# Patient Record
Sex: MALE | Race: WHITE | Employment: UNEMPLOYED | URBAN - NONMETROPOLITAN AREA
[De-identification: names, ages, dates, MRNs, and addresses within clinical notes are randomized per-mention and may not be internally consistent; named-entity substitution may affect disease eponyms.]

---

## 2017-06-14 ENCOUNTER — APPOINTMENT (OUTPATIENT)
Dept: CT IMAGING | Age: 22
DRG: 885 | End: 2017-06-14
Payer: COMMERCIAL

## 2017-06-14 ENCOUNTER — HOSPITAL ENCOUNTER (INPATIENT)
Age: 22
LOS: 5 days | Discharge: HOME | DRG: 885 | End: 2017-06-20
Attending: FAMILY MEDICINE | Admitting: PSYCHIATRY & NEUROLOGY
Payer: COMMERCIAL

## 2017-06-14 DIAGNOSIS — R44.0 AUDITORY HALLUCINATION: Primary | ICD-10-CM

## 2017-06-14 LAB
ALBUMIN SERPL-MCNC: 3.5 G/DL (ref 3.5–5.2)
ALP BLD-CCNC: 45 U/L (ref 40–130)
ALT SERPL-CCNC: 12 U/L (ref 5–41)
AMPHETAMINE SCREEN, URINE: POSITIVE
ANION GAP SERPL CALCULATED.3IONS-SCNC: 12 MMOL/L (ref 7–19)
AST SERPL-CCNC: 11 U/L (ref 5–40)
BARBITURATE SCREEN URINE: NEGATIVE
BASOPHILS ABSOLUTE: 0 K/UL (ref 0–0.2)
BASOPHILS RELATIVE PERCENT: 0.6 % (ref 0–1)
BENZODIAZEPINE SCREEN, URINE: NEGATIVE
BILIRUB SERPL-MCNC: 0.3 MG/DL (ref 0.2–1.2)
BILIRUBIN URINE: NEGATIVE
BLOOD, URINE: NEGATIVE
BUN BLDV-MCNC: 10 MG/DL (ref 6–20)
CALCIUM SERPL-MCNC: 7.1 MG/DL (ref 8.6–10)
CANNABINOID SCREEN URINE: POSITIVE
CHLORIDE BLD-SCNC: 106 MMOL/L (ref 98–111)
CLARITY: CLEAR
CO2: 22 MMOL/L (ref 22–29)
COCAINE METABOLITE SCREEN URINE: NEGATIVE
COLOR: YELLOW
CREAT SERPL-MCNC: 0.9 MG/DL (ref 0.5–1.2)
EOSINOPHILS ABSOLUTE: 0.2 K/UL (ref 0–0.6)
EOSINOPHILS RELATIVE PERCENT: 2.6 % (ref 0–5)
ETHANOL: <10 MG/DL (ref 0–0.08)
GFR NON-AFRICAN AMERICAN: >60
GLUCOSE BLD-MCNC: 72 MG/DL (ref 74–109)
GLUCOSE URINE: NEGATIVE MG/DL
HCT VFR BLD CALC: 36.3 % (ref 42–52)
HEMOGLOBIN: 12.8 G/DL (ref 14–18)
KETONES, URINE: NEGATIVE MG/DL
LEUKOCYTE ESTERASE, URINE: NEGATIVE
LYMPHOCYTES ABSOLUTE: 1.7 K/UL (ref 1.1–4.5)
LYMPHOCYTES RELATIVE PERCENT: 27.3 % (ref 20–40)
Lab: ABNORMAL
MCH RBC QN AUTO: 33.8 PG (ref 27–31)
MCHC RBC AUTO-ENTMCNC: 35.3 G/DL (ref 33–37)
MCV RBC AUTO: 95.8 FL (ref 80–94)
MONOCYTES ABSOLUTE: 1 K/UL (ref 0–0.9)
MONOCYTES RELATIVE PERCENT: 16.6 % (ref 0–10)
NEUTROPHILS ABSOLUTE: 3.2 K/UL (ref 1.5–7.5)
NEUTROPHILS RELATIVE PERCENT: 52.6 % (ref 50–65)
NITRITE, URINE: NEGATIVE
OPIATE SCREEN URINE: NEGATIVE
PDW BLD-RTO: 11.5 % (ref 11.5–14.5)
PH UA: 6
PLATELET # BLD: 205 K/UL (ref 130–400)
PMV BLD AUTO: 9.1 FL (ref 9.4–12.4)
POTASSIUM SERPL-SCNC: 3.2 MMOL/L (ref 3.5–5)
PROTEIN UA: NEGATIVE MG/DL
RBC # BLD: 3.79 M/UL (ref 4.7–6.1)
SODIUM BLD-SCNC: 140 MMOL/L (ref 136–145)
SPECIFIC GRAVITY UA: 1.02
TOTAL PROTEIN: 5.1 G/DL (ref 6.6–8.7)
TROPONIN: <0.01 NG/ML (ref 0–0.03)
TSH SERPL DL<=0.05 MIU/L-ACNC: 2.84 UIU/ML (ref 0.27–4.2)
UROBILINOGEN, URINE: 1 E.U./DL
WBC # BLD: 6.2 K/UL (ref 4.8–10.8)

## 2017-06-14 PROCEDURE — 84443 ASSAY THYROID STIM HORMONE: CPT

## 2017-06-14 PROCEDURE — 85025 COMPLETE CBC W/AUTO DIFF WBC: CPT

## 2017-06-14 PROCEDURE — 80053 COMPREHEN METABOLIC PANEL: CPT

## 2017-06-14 PROCEDURE — 99284 EMERGENCY DEPT VISIT MOD MDM: CPT | Performed by: EMERGENCY MEDICINE

## 2017-06-14 PROCEDURE — 70450 CT HEAD/BRAIN W/O DYE: CPT

## 2017-06-14 PROCEDURE — 2580000003 HC RX 258: Performed by: FAMILY MEDICINE

## 2017-06-14 PROCEDURE — 84484 ASSAY OF TROPONIN QUANT: CPT

## 2017-06-14 PROCEDURE — 36415 COLL VENOUS BLD VENIPUNCTURE: CPT

## 2017-06-14 PROCEDURE — 93005 ELECTROCARDIOGRAM TRACING: CPT

## 2017-06-14 PROCEDURE — G0480 DRUG TEST DEF 1-7 CLASSES: HCPCS

## 2017-06-14 PROCEDURE — 99285 EMERGENCY DEPT VISIT HI MDM: CPT

## 2017-06-14 PROCEDURE — 80307 DRUG TEST PRSMV CHEM ANLYZR: CPT

## 2017-06-14 RX ORDER — DEXTROAMPHETAMINE SACCHARATE, AMPHETAMINE ASPARTATE, DEXTROAMPHETAMINE SULFATE AND AMPHETAMINE SULFATE 5; 5; 5; 5 MG/1; MG/1; MG/1; MG/1
20 TABLET ORAL DAILY
Status: ON HOLD | COMMUNITY
End: 2017-06-20

## 2017-06-14 RX ORDER — 0.9 % SODIUM CHLORIDE 0.9 %
1000 INTRAVENOUS SOLUTION INTRAVENOUS ONCE
Status: COMPLETED | OUTPATIENT
Start: 2017-06-14 | End: 2017-06-14

## 2017-06-14 RX ADMIN — SODIUM CHLORIDE 1000 ML: 9 INJECTION, SOLUTION INTRAVENOUS at 17:31

## 2017-06-15 PROCEDURE — 1240000000 HC EMOTIONAL WELLNESS R&B

## 2017-06-16 PROCEDURE — 1240000000 HC EMOTIONAL WELLNESS R&B

## 2017-06-16 ASSESSMENT — SLEEP AND FATIGUE QUESTIONNAIRES
DO YOU HAVE DIFFICULTY SLEEPING: NO
DO YOU USE A SLEEP AID: NO

## 2017-06-16 ASSESSMENT — LIFESTYLE VARIABLES: HISTORY_ALCOHOL_USE: NO

## 2017-06-17 LAB
ANION GAP SERPL CALCULATED.3IONS-SCNC: 13 MMOL/L (ref 7–19)
BUN BLDV-MCNC: 13 MG/DL (ref 6–20)
CALCIUM SERPL-MCNC: 9.6 MG/DL (ref 8.6–10)
CHLORIDE BLD-SCNC: 101 MMOL/L (ref 98–111)
CO2: 27 MMOL/L (ref 22–29)
CREAT SERPL-MCNC: 1.2 MG/DL (ref 0.5–1.2)
GFR NON-AFRICAN AMERICAN: >60
GLUCOSE BLD-MCNC: 86 MG/DL (ref 74–109)
POTASSIUM SERPL-SCNC: 4.4 MMOL/L (ref 3.5–5)
SODIUM BLD-SCNC: 141 MMOL/L (ref 136–145)
VITAMIN B-12: 468 PG/ML (ref 211–946)
VITAMIN D 25-HYDROXY: 41.1 NG/ML

## 2017-06-17 PROCEDURE — 36415 COLL VENOUS BLD VENIPUNCTURE: CPT

## 2017-06-17 PROCEDURE — 90791 PSYCH DIAGNOSTIC EVALUATION: CPT | Performed by: PSYCHIATRY & NEUROLOGY

## 2017-06-17 PROCEDURE — 1240000000 HC EMOTIONAL WELLNESS R&B

## 2017-06-17 PROCEDURE — 82306 VITAMIN D 25 HYDROXY: CPT

## 2017-06-17 PROCEDURE — 80048 BASIC METABOLIC PNL TOTAL CA: CPT

## 2017-06-17 PROCEDURE — 82607 VITAMIN B-12: CPT

## 2017-06-18 PROCEDURE — 99231 SBSQ HOSP IP/OBS SF/LOW 25: CPT | Performed by: NURSE PRACTITIONER

## 2017-06-18 PROCEDURE — 1240000000 HC EMOTIONAL WELLNESS R&B

## 2017-06-19 PROCEDURE — 1240000000 HC EMOTIONAL WELLNESS R&B

## 2017-06-19 PROCEDURE — 99232 SBSQ HOSP IP/OBS MODERATE 35: CPT | Performed by: PSYCHIATRY & NEUROLOGY

## 2017-06-20 VITALS
SYSTOLIC BLOOD PRESSURE: 126 MMHG | HEIGHT: 71 IN | WEIGHT: 152 LBS | BODY MASS INDEX: 21.28 KG/M2 | RESPIRATION RATE: 18 BRPM | DIASTOLIC BLOOD PRESSURE: 64 MMHG | OXYGEN SATURATION: 100 % | TEMPERATURE: 98 F | HEART RATE: 79 BPM

## 2017-06-20 PROCEDURE — 99239 HOSP IP/OBS DSCHRG MGMT >30: CPT | Performed by: PSYCHIATRY & NEUROLOGY

## 2017-06-20 PROCEDURE — 5130000000 HC BRIDGE APPOINTMENT

## 2024-01-13 ENCOUNTER — INPATIENT (INPATIENT)
Facility: HOSPITAL | Age: 29
LOS: 9 days | Discharge: HOME | DRG: 881 | End: 2024-01-23
Attending: GENERAL ACUTE CARE HOSPITAL | Admitting: HOSPITALIST
Payer: COMMERCIAL

## 2024-01-13 VITALS
TEMPERATURE: 98 F | OXYGEN SATURATION: 99 % | HEART RATE: 103 BPM | SYSTOLIC BLOOD PRESSURE: 134 MMHG | RESPIRATION RATE: 18 BRPM | DIASTOLIC BLOOD PRESSURE: 82 MMHG

## 2024-01-13 DIAGNOSIS — F33.2 MAJOR DEPRESSIVE DISORDER, RECURRENT SEVERE WITHOUT PSYCHOTIC FEATURES: ICD-10-CM

## 2024-01-13 LAB
ALBUMIN SERPL ELPH-MCNC: 4.4 G/DL — SIGNIFICANT CHANGE UP (ref 3.3–5.2)
ALBUMIN SERPL ELPH-MCNC: 4.4 G/DL — SIGNIFICANT CHANGE UP (ref 3.3–5.2)
ALP SERPL-CCNC: 71 U/L — SIGNIFICANT CHANGE UP (ref 40–120)
ALP SERPL-CCNC: 71 U/L — SIGNIFICANT CHANGE UP (ref 40–120)
ALT FLD-CCNC: 18 U/L — SIGNIFICANT CHANGE UP
ALT FLD-CCNC: 18 U/L — SIGNIFICANT CHANGE UP
AMPHET UR-MCNC: NEGATIVE — SIGNIFICANT CHANGE UP
AMPHET UR-MCNC: NEGATIVE — SIGNIFICANT CHANGE UP
ANION GAP SERPL CALC-SCNC: 15 MMOL/L — SIGNIFICANT CHANGE UP (ref 5–17)
ANION GAP SERPL CALC-SCNC: 15 MMOL/L — SIGNIFICANT CHANGE UP (ref 5–17)
APAP SERPL-MCNC: <3 UG/ML — LOW (ref 10–26)
APAP SERPL-MCNC: <3 UG/ML — LOW (ref 10–26)
APPEARANCE UR: CLEAR — SIGNIFICANT CHANGE UP
APPEARANCE UR: CLEAR — SIGNIFICANT CHANGE UP
APTT BLD: 27.9 SEC — SIGNIFICANT CHANGE UP (ref 24.5–35.6)
APTT BLD: 27.9 SEC — SIGNIFICANT CHANGE UP (ref 24.5–35.6)
AST SERPL-CCNC: 28 U/L — SIGNIFICANT CHANGE UP
AST SERPL-CCNC: 28 U/L — SIGNIFICANT CHANGE UP
BACTERIA # UR AUTO: ABNORMAL /HPF
BACTERIA # UR AUTO: ABNORMAL /HPF
BARBITURATES UR SCN-MCNC: NEGATIVE — SIGNIFICANT CHANGE UP
BARBITURATES UR SCN-MCNC: NEGATIVE — SIGNIFICANT CHANGE UP
BASE EXCESS BLDV CALC-SCNC: -3.3 MMOL/L — LOW (ref -2–3)
BASE EXCESS BLDV CALC-SCNC: -3.3 MMOL/L — LOW (ref -2–3)
BASOPHILS # BLD AUTO: 0.08 K/UL — SIGNIFICANT CHANGE UP (ref 0–0.2)
BASOPHILS # BLD AUTO: 0.08 K/UL — SIGNIFICANT CHANGE UP (ref 0–0.2)
BASOPHILS NFR BLD AUTO: 0.8 % — SIGNIFICANT CHANGE UP (ref 0–2)
BASOPHILS NFR BLD AUTO: 0.8 % — SIGNIFICANT CHANGE UP (ref 0–2)
BENZODIAZ UR-MCNC: NEGATIVE — SIGNIFICANT CHANGE UP
BENZODIAZ UR-MCNC: NEGATIVE — SIGNIFICANT CHANGE UP
BILIRUB SERPL-MCNC: 0.5 MG/DL — SIGNIFICANT CHANGE UP (ref 0.4–2)
BILIRUB SERPL-MCNC: 0.5 MG/DL — SIGNIFICANT CHANGE UP (ref 0.4–2)
BILIRUB UR-MCNC: NEGATIVE — SIGNIFICANT CHANGE UP
BILIRUB UR-MCNC: NEGATIVE — SIGNIFICANT CHANGE UP
BLD GP AB SCN SERPL QL: SIGNIFICANT CHANGE UP
BLD GP AB SCN SERPL QL: SIGNIFICANT CHANGE UP
BUN SERPL-MCNC: 9.5 MG/DL — SIGNIFICANT CHANGE UP (ref 8–20)
BUN SERPL-MCNC: 9.5 MG/DL — SIGNIFICANT CHANGE UP (ref 8–20)
CALCIUM SERPL-MCNC: 9.2 MG/DL — SIGNIFICANT CHANGE UP (ref 8.4–10.5)
CALCIUM SERPL-MCNC: 9.2 MG/DL — SIGNIFICANT CHANGE UP (ref 8.4–10.5)
CHLORIDE SERPL-SCNC: 104 MMOL/L — SIGNIFICANT CHANGE UP (ref 96–108)
CHLORIDE SERPL-SCNC: 104 MMOL/L — SIGNIFICANT CHANGE UP (ref 96–108)
CO2 SERPL-SCNC: 19 MMOL/L — LOW (ref 22–29)
CO2 SERPL-SCNC: 19 MMOL/L — LOW (ref 22–29)
COCAINE METAB.OTHER UR-MCNC: NEGATIVE — SIGNIFICANT CHANGE UP
COCAINE METAB.OTHER UR-MCNC: NEGATIVE — SIGNIFICANT CHANGE UP
COLOR SPEC: YELLOW — SIGNIFICANT CHANGE UP
COLOR SPEC: YELLOW — SIGNIFICANT CHANGE UP
CREAT SERPL-MCNC: 0.89 MG/DL — SIGNIFICANT CHANGE UP (ref 0.5–1.3)
CREAT SERPL-MCNC: 0.89 MG/DL — SIGNIFICANT CHANGE UP (ref 0.5–1.3)
DIFF PNL FLD: NEGATIVE — SIGNIFICANT CHANGE UP
DIFF PNL FLD: NEGATIVE — SIGNIFICANT CHANGE UP
EGFR: 120 ML/MIN/1.73M2 — SIGNIFICANT CHANGE UP
EGFR: 120 ML/MIN/1.73M2 — SIGNIFICANT CHANGE UP
EOSINOPHIL # BLD AUTO: 0.08 K/UL — SIGNIFICANT CHANGE UP (ref 0–0.5)
EOSINOPHIL # BLD AUTO: 0.08 K/UL — SIGNIFICANT CHANGE UP (ref 0–0.5)
EOSINOPHIL NFR BLD AUTO: 0.8 % — SIGNIFICANT CHANGE UP (ref 0–6)
EOSINOPHIL NFR BLD AUTO: 0.8 % — SIGNIFICANT CHANGE UP (ref 0–6)
ETHANOL SERPL-MCNC: <10 MG/DL — SIGNIFICANT CHANGE UP (ref 0–9)
ETHANOL SERPL-MCNC: <10 MG/DL — SIGNIFICANT CHANGE UP (ref 0–9)
GLUCOSE SERPL-MCNC: 109 MG/DL — HIGH (ref 70–99)
GLUCOSE SERPL-MCNC: 109 MG/DL — HIGH (ref 70–99)
GLUCOSE UR QL: NEGATIVE MG/DL — SIGNIFICANT CHANGE UP
GLUCOSE UR QL: NEGATIVE MG/DL — SIGNIFICANT CHANGE UP
HCG SERPL-ACNC: <4 MIU/ML — HIGH
HCG SERPL-ACNC: <4 MIU/ML — HIGH
HCO3 BLDV-SCNC: 22 MMOL/L — SIGNIFICANT CHANGE UP (ref 22–29)
HCO3 BLDV-SCNC: 22 MMOL/L — SIGNIFICANT CHANGE UP (ref 22–29)
HCT VFR BLD CALC: 37.3 % — LOW (ref 39–50)
HCT VFR BLD CALC: 37.3 % — LOW (ref 39–50)
HGB BLD-MCNC: 13.1 G/DL — SIGNIFICANT CHANGE UP (ref 13–17)
HGB BLD-MCNC: 13.1 G/DL — SIGNIFICANT CHANGE UP (ref 13–17)
IMM GRANULOCYTES NFR BLD AUTO: 1.1 % — HIGH (ref 0–0.9)
IMM GRANULOCYTES NFR BLD AUTO: 1.1 % — HIGH (ref 0–0.9)
INR BLD: 1.05 RATIO — SIGNIFICANT CHANGE UP (ref 0.85–1.18)
INR BLD: 1.05 RATIO — SIGNIFICANT CHANGE UP (ref 0.85–1.18)
KETONES UR-MCNC: 40 MG/DL
KETONES UR-MCNC: 40 MG/DL
LEUKOCYTE ESTERASE UR-ACNC: NEGATIVE — SIGNIFICANT CHANGE UP
LEUKOCYTE ESTERASE UR-ACNC: NEGATIVE — SIGNIFICANT CHANGE UP
LIDOCAIN IGE QN: 21 U/L — LOW (ref 22–51)
LIDOCAIN IGE QN: 21 U/L — LOW (ref 22–51)
LYMPHOCYTES # BLD AUTO: 1.62 K/UL — SIGNIFICANT CHANGE UP (ref 1–3.3)
LYMPHOCYTES # BLD AUTO: 1.62 K/UL — SIGNIFICANT CHANGE UP (ref 1–3.3)
LYMPHOCYTES # BLD AUTO: 15.9 % — SIGNIFICANT CHANGE UP (ref 13–44)
LYMPHOCYTES # BLD AUTO: 15.9 % — SIGNIFICANT CHANGE UP (ref 13–44)
MCHC RBC-ENTMCNC: 33 PG — SIGNIFICANT CHANGE UP (ref 27–34)
MCHC RBC-ENTMCNC: 33 PG — SIGNIFICANT CHANGE UP (ref 27–34)
MCHC RBC-ENTMCNC: 35.1 GM/DL — SIGNIFICANT CHANGE UP (ref 32–36)
MCHC RBC-ENTMCNC: 35.1 GM/DL — SIGNIFICANT CHANGE UP (ref 32–36)
MCV RBC AUTO: 94 FL — SIGNIFICANT CHANGE UP (ref 80–100)
MCV RBC AUTO: 94 FL — SIGNIFICANT CHANGE UP (ref 80–100)
METHADONE UR-MCNC: NEGATIVE — SIGNIFICANT CHANGE UP
METHADONE UR-MCNC: NEGATIVE — SIGNIFICANT CHANGE UP
MONOCYTES # BLD AUTO: 1.19 K/UL — HIGH (ref 0–0.9)
MONOCYTES # BLD AUTO: 1.19 K/UL — HIGH (ref 0–0.9)
MONOCYTES NFR BLD AUTO: 11.7 % — SIGNIFICANT CHANGE UP (ref 2–14)
MONOCYTES NFR BLD AUTO: 11.7 % — SIGNIFICANT CHANGE UP (ref 2–14)
NEUTROPHILS # BLD AUTO: 7.12 K/UL — SIGNIFICANT CHANGE UP (ref 1.8–7.4)
NEUTROPHILS # BLD AUTO: 7.12 K/UL — SIGNIFICANT CHANGE UP (ref 1.8–7.4)
NEUTROPHILS NFR BLD AUTO: 69.7 % — SIGNIFICANT CHANGE UP (ref 43–77)
NEUTROPHILS NFR BLD AUTO: 69.7 % — SIGNIFICANT CHANGE UP (ref 43–77)
NITRITE UR-MCNC: NEGATIVE — SIGNIFICANT CHANGE UP
NITRITE UR-MCNC: NEGATIVE — SIGNIFICANT CHANGE UP
OPIATES UR-MCNC: NEGATIVE — SIGNIFICANT CHANGE UP
OPIATES UR-MCNC: NEGATIVE — SIGNIFICANT CHANGE UP
PCO2 BLDV: 38 MMHG — LOW (ref 42–55)
PCO2 BLDV: 38 MMHG — LOW (ref 42–55)
PCP SPEC-MCNC: SIGNIFICANT CHANGE UP
PCP SPEC-MCNC: SIGNIFICANT CHANGE UP
PCP UR-MCNC: NEGATIVE — SIGNIFICANT CHANGE UP
PCP UR-MCNC: NEGATIVE — SIGNIFICANT CHANGE UP
PH BLDV: 7.37 — SIGNIFICANT CHANGE UP (ref 7.32–7.43)
PH BLDV: 7.37 — SIGNIFICANT CHANGE UP (ref 7.32–7.43)
PH UR: 7 — SIGNIFICANT CHANGE UP (ref 5–8)
PH UR: 7 — SIGNIFICANT CHANGE UP (ref 5–8)
PLATELET # BLD AUTO: 284 K/UL — SIGNIFICANT CHANGE UP (ref 150–400)
PLATELET # BLD AUTO: 284 K/UL — SIGNIFICANT CHANGE UP (ref 150–400)
PO2 BLDV: 89 MMHG — HIGH (ref 25–45)
PO2 BLDV: 89 MMHG — HIGH (ref 25–45)
POTASSIUM SERPL-MCNC: 3.3 MMOL/L — LOW (ref 3.5–5.3)
POTASSIUM SERPL-MCNC: 3.3 MMOL/L — LOW (ref 3.5–5.3)
POTASSIUM SERPL-SCNC: 3.3 MMOL/L — LOW (ref 3.5–5.3)
POTASSIUM SERPL-SCNC: 3.3 MMOL/L — LOW (ref 3.5–5.3)
PROT SERPL-MCNC: 7.1 G/DL — SIGNIFICANT CHANGE UP (ref 6.6–8.7)
PROT SERPL-MCNC: 7.1 G/DL — SIGNIFICANT CHANGE UP (ref 6.6–8.7)
PROT UR-MCNC: 30 MG/DL
PROT UR-MCNC: 30 MG/DL
PROTHROM AB SERPL-ACNC: 11.6 SEC — SIGNIFICANT CHANGE UP (ref 9.5–13)
PROTHROM AB SERPL-ACNC: 11.6 SEC — SIGNIFICANT CHANGE UP (ref 9.5–13)
RBC # BLD: 3.97 M/UL — LOW (ref 4.2–5.8)
RBC # BLD: 3.97 M/UL — LOW (ref 4.2–5.8)
RBC # FLD: 12.4 % — SIGNIFICANT CHANGE UP (ref 10.3–14.5)
RBC # FLD: 12.4 % — SIGNIFICANT CHANGE UP (ref 10.3–14.5)
RBC CASTS # UR COMP ASSIST: 4 /HPF — SIGNIFICANT CHANGE UP (ref 0–4)
RBC CASTS # UR COMP ASSIST: 4 /HPF — SIGNIFICANT CHANGE UP (ref 0–4)
SALICYLATES SERPL-MCNC: <0.6 MG/DL — LOW (ref 10–20)
SALICYLATES SERPL-MCNC: <0.6 MG/DL — LOW (ref 10–20)
SAO2 % BLDV: 98.4 % — SIGNIFICANT CHANGE UP
SAO2 % BLDV: 98.4 % — SIGNIFICANT CHANGE UP
SARS-COV-2 RNA SPEC QL NAA+PROBE: DETECTED
SARS-COV-2 RNA SPEC QL NAA+PROBE: DETECTED
SODIUM SERPL-SCNC: 138 MMOL/L — SIGNIFICANT CHANGE UP (ref 135–145)
SODIUM SERPL-SCNC: 138 MMOL/L — SIGNIFICANT CHANGE UP (ref 135–145)
SP GR SPEC: >1.03 — HIGH (ref 1–1.03)
SP GR SPEC: >1.03 — HIGH (ref 1–1.03)
SQUAMOUS # UR AUTO: 1 /HPF — SIGNIFICANT CHANGE UP (ref 0–5)
SQUAMOUS # UR AUTO: 1 /HPF — SIGNIFICANT CHANGE UP (ref 0–5)
THC UR QL: POSITIVE
THC UR QL: POSITIVE
UROBILINOGEN FLD QL: 0.2 MG/DL — SIGNIFICANT CHANGE UP (ref 0.2–1)
UROBILINOGEN FLD QL: 0.2 MG/DL — SIGNIFICANT CHANGE UP (ref 0.2–1)
WBC # BLD: 10.2 K/UL — SIGNIFICANT CHANGE UP (ref 3.8–10.5)
WBC # BLD: 10.2 K/UL — SIGNIFICANT CHANGE UP (ref 3.8–10.5)
WBC # FLD AUTO: 10.2 K/UL — SIGNIFICANT CHANGE UP (ref 3.8–10.5)
WBC # FLD AUTO: 10.2 K/UL — SIGNIFICANT CHANGE UP (ref 3.8–10.5)
WBC UR QL: 73 /HPF — HIGH (ref 0–5)
WBC UR QL: 73 /HPF — HIGH (ref 0–5)

## 2024-01-13 PROCEDURE — 99285 EMERGENCY DEPT VISIT HI MDM: CPT

## 2024-01-13 PROCEDURE — 70450 CT HEAD/BRAIN W/O DYE: CPT | Mod: 26,MA,59

## 2024-01-13 PROCEDURE — 70496 CT ANGIOGRAPHY HEAD: CPT | Mod: 26,MA

## 2024-01-13 PROCEDURE — 71260 CT THORAX DX C+: CPT | Mod: 26,MA

## 2024-01-13 PROCEDURE — 74177 CT ABD & PELVIS W/CONTRAST: CPT | Mod: 26,MA

## 2024-01-13 PROCEDURE — 72125 CT NECK SPINE W/O DYE: CPT | Mod: 26,MA

## 2024-01-13 PROCEDURE — 70498 CT ANGIOGRAPHY NECK: CPT | Mod: 26,MA

## 2024-01-13 RX ORDER — SODIUM CHLORIDE 9 MG/ML
250 INJECTION INTRAMUSCULAR; INTRAVENOUS; SUBCUTANEOUS ONCE
Refills: 0 | Status: COMPLETED | OUTPATIENT
Start: 2024-01-13 | End: 2024-01-13

## 2024-01-13 RX ORDER — BUPROPION HYDROCHLORIDE 150 MG/1
300 TABLET, EXTENDED RELEASE ORAL DAILY
Refills: 0 | Status: DISCONTINUED | OUTPATIENT
Start: 2024-01-13 | End: 2024-01-23

## 2024-01-13 RX ADMIN — SODIUM CHLORIDE 250 MILLILITER(S): 9 INJECTION INTRAMUSCULAR; INTRAVENOUS; SUBCUTANEOUS at 14:07

## 2024-01-13 NOTE — ED BEHAVIORAL HEALTH ASSESSMENT NOTE - HPI (INCLUDE ILLNESS QUALITY, SEVERITY, DURATION, TIMING, CONTEXT, MODIFYING FACTORS, ASSOCIATED SIGNS AND SYMPTOMS)
Patient is a 28 year old male to female; prefers to be called "Kassy"; domiciled with mother; single; noncaregiver; past psychiatric history of depression; prior psychiatric  hospitalizations; multiple prior suicide attempts; no known history of violence or arrests; recent use of acid; PMH of hormone therapy; brought in by EMS; called by family; presenting after jumping from second floor balcony; in the setting of taking acid yesterday.     Upon assessment, pt presents extremely tearful and remorseful for his actions but remains frustrated and states that he has "tried several ways." Pt adds that he punched his laptop, breaking the screen then attempted to "run away" yesterday until his car broke down. Pt endorses running away when he first transitioned and moving to [facility]. Pt endorses receiving his mental health treatment through "[facility]"; therapist is Pradeep March and psychiatrist is Connie Crews; currently on Wellbutrin 250 mg XR & 75 mg XL. Pt agrees that inpatient treatment is most appropriate at this time. Patient is a 28 year old male to female; prefers to be called "Kassy"; domiciled with mother; single; noncaregiver; past psychiatric history of depression; prior psychiatric  hospitalizations; multiple prior suicide attempts; no known history of violence or arrests; recent use of acid; PMH of hormone therapy; brought in by EMS; called by family; presenting after jumping from second floor balcony; in the setting of taking acid yesterday.     Upon assessment, pt presents extremely tearful and remorseful for his actions but remains frustrated and states that he has "tried several ways." Pt adds that he punched his laptop, breaking the screen then attempted to "run away" yesterday until his car broke down. Pt endorses running away when he first transitioned and moving to [facility]. Pt endorses receiving his mental health treatment through "[facility]"; therapist is Pradeep March and psychiatrist is Connie Crews; currently on Wellbutrin 250 mg XR & 75 mg XL. Pt agrees that inpatient treatment is most appropriate at this time.

## 2024-01-13 NOTE — ED PROVIDER NOTE - PROGRESS NOTE DETAILS
Patient with need for inpatient care.  d/w psych and will send to our  unit pending txfr. Patient signed out pending evaluation.  will need inpatient psych.  pending transfer destination Patient COVID-positive and cannot be transferred to inpatient psych at this time as there were no inpatient COVID psychiatric beds any longer.  Patient will require medical admission and then inpatient transfer to psych after 5 days.  Case discussed with hospitalist and will admit

## 2024-01-13 NOTE — ED ADULT NURSE NOTE - NSFALLUNIVINTERV_ED_ALL_ED
Bed/Stretcher in lowest position, wheels locked, appropriate side rails in place/Call bell, personal items and telephone in reach/Instruct patient to call for assistance before getting out of bed/chair/stretcher/Non-slip footwear applied when patient is off stretcher/[place] to call system/Physically safe environment - no spills, clutter or unnecessary equipment/Purposeful proactive rounding/Room/bathroom lighting operational, light cord in reach Bed/Stretcher in lowest position, wheels locked, appropriate side rails in place/Call bell, personal items and telephone in reach/Instruct patient to call for assistance before getting out of bed/chair/stretcher/Non-slip footwear applied when patient is off stretcher/[place] to call system/Physically safe environment - no spills, clutter or unnecessary equipment/Purposeful proactive rounding/Room/bathroom lighting operational, light cord in reach

## 2024-01-13 NOTE — ED PROVIDER NOTE - PHYSICAL EXAMINATION
General:  Appears stated age, in no acute distress, alert  Head:  Normocephalic,   Forehead contusion  Eyes:  PERRLA, EOMI, no proptosis or enophthalmos   ENMT:  Atraumatic external nose and ears  Neck:  C-collar in place, trachea midline  Cardiac: Regular rate and regular rhythm. No murmurs. Peripheral pulses 2+ and symmetric in all extremities. No LE edema.  Resp: Unlabored respiratory effort. Lungs CTAB.   Chest:  Symmetric chest rise respirations, no abrasions or ecchymosis, NTTP  Abdomen: Soft, nontender, nondistended, no abrasions or ecchymosis  Extremities: No gross deformities  Back: C/T/L/S-spine midline and nontender, no stepoffs   Skin: Warm and dry, no rashes, abrasions, or lacerations  Neuro: AO x 3, moves all extremities symmetrically, motor strength 5/5 bilaterally UE and LE, sensation grossly intact

## 2024-01-13 NOTE — ED ADULT NURSE REASSESSMENT NOTE - NS ED NURSE REASSESS COMMENT FT1
plan of care assumed from Kely LIAO @ 1920, no S&s of acute distress noted, pt denies pain, SI/HI. endorses depression "for a while now" and wants to seek tx. psych came to see pt. pt to be transferred to inpatient psych. MD MCKENZIE @ bedside update pt and family on plan of care. constant observation ongoing.

## 2024-01-13 NOTE — CHART NOTE - NSCHARTNOTEFT_GEN_A_CORE
SWNote: pt seen by behavioral PA(Roe) pt needs transfer 9.27 status . Pt male to female , identifies as Kassy . Awaiting all lab results to request bed . SW to follow.

## 2024-01-13 NOTE — ED BEHAVIORAL HEALTH ASSESSMENT NOTE - NSACTIVEVENT_PSY_ALL_CORE
transitioning/Triggering events leading to humiliation, shame, and/or despair (e.g., Loss of relationship, financial or health status) (real or anticipated)

## 2024-01-13 NOTE — H&P ADULT - HISTORY OF PRESENT ILLNESS
29 yo male presents after jumping out a 2-story-building and attempting to stab himself. Unknown PMH/PSH.   A: airway intact   B: bilateral breath sounds   C: bilateral femoral pulses and carotid pulses   D: GCS 11 on primary, GCS 15 on secondary, 3mm pupil PERRL  29 yo male transitioned to female presents after the patient was found on the ground outside of the house by family, who reports the patient jumped from the second floor window in a suicide attempt followed by self-harm by knife. Unknown PMH/PSH.    A: airway intact, c-collar placed  B: bilateral breath sounds   C: bilateral femoral pulses and carotid pulses   D: GCS 11 on primary, GCS 15 on secondary, 3mm pupil PERRL   E: right forehead abrasion, right flank abrasion, __________ 27 yo male transitioned to female presents after the patient was found on the ground outside of the house by family, who reports the patient jumped from the second floor window in a suicide attempt followed by self-harm by knife. Unknown PMH/PSH.    A: airway intact, c-collar placed  B: bilateral breath sounds   C: bilateral femoral pulses and carotid pulses   D: GCS 11 on primary, GCS 15 on secondary, 3mm pupil PERRL   E: right forehead abrasion, right flank abrasion 29 yo male transitioned to female presents after the patient was found on the ground outside of the house by family, who reports the patient jumped from the second floor window in a suicide attempt followed by self-harm by knife. Unknown PMH/PSH.    A: airway intact, c-collar placed  B: bilateral breath sounds   C: bilateral femoral pulses and carotid pulses   D: GCS 11 on primary, GCS 15 on secondary, 3mm pupil PERRL   E: right forehead abrasion, right flank abrasion

## 2024-01-13 NOTE — ED BEHAVIORAL HEALTH ASSESSMENT NOTE - RISK ASSESSMENT
RF: multiple suicide attempts, transitioning, presenting signs  PF: Domiciled with family, no violence, pt cooperative, help seeking

## 2024-01-13 NOTE — ED PROVIDER NOTE - OBJECTIVE STATEMENT
28-year-old male with unknown medical history BIBEMS/ PD found on ground outside of house with report that patient jumped from the second floor window  in a suicide attempt.  Patient was also witnessed by family of self-harm by knife.  Patient not answering questions.  Noted to be saturating well on room air without respiratory distress.  Code trauma be activated.   C-collar placed.

## 2024-01-13 NOTE — ED ADULT NURSE REASSESSMENT NOTE - NS ED NURSE REASSESS COMMENT FT1
pt sleeping and arouses easily, A&Ox4, resp even and unlabored no distress noted, pt denies any pain and has no complaints at this time, bed in lowest position, side rails up and 1:1 remains at bedside, mother and pt educated on plan of care and verbalized understanding, report given to RN ALETHEA and pt moved to B12R

## 2024-01-13 NOTE — ED ADULT NURSE NOTE - HPI (INCLUDE ILLNESS QUALITY, SEVERITY, DURATION, TIMING, CONTEXT, MODIFYING FACTORS, ASSOCIATED SIGNS AND SYMPTOMS)
Patient arrived to  in yellow gown, patient was medically cleared by attending from main ED. Patient was wand by security for contraband, was compliant with process. Patient noted with poor eyes contact, flat affect. Patient denied visual and auditory hallucinations, reported suicidal thoughts. Patient was brought in by family after ingestion of some acid substance drug secondary to pain in the genital area. Patient jumped from 2 floors after ingestion of the substance. No injuries. Patient reported skin picking habit, many cicatrices noted to his leg, his hand. Patient denied any previous hospitalization at [facility], protocols were explained to patient , he verbalized understanding an he agreed with the plan, no safety concern to report at this time.

## 2024-01-13 NOTE — ED ADULT TRIAGE NOTE - CHIEF COMPLAINT QUOTE
Patient jumped out a second story window, and attempted to stab herself. As per PD, patient also "swallowed unknown objects". Trauma B called upon arrival. Of note, Patient goes by Kassy.

## 2024-01-13 NOTE — ED ADULT NURSE REASSESSMENT NOTE - NS ED NURSE REASSESS COMMENT FT1
Received pt from [facility] after Trauma B completion. Pt is awake, alert and calm at this time and is responding to questions appropriately. C-collar in place from trauma. Pt is in yellow gown with belongings secured. 1:1 at bedside, pending psych evaluation. Received pt from [facility] after Trauma B completion. Pt is awake, alert and calm at this time and is responding to questions appropriately. C-collar in place from trauma. Pt is in yellow gown with belongings secured. 1:1 at bedside, pending psych evaluation.

## 2024-01-13 NOTE — ED PROVIDER NOTE - CLINICAL SUMMARY MEDICAL DECISION MAKING FREE TEXT BOX
28-year-old male found outside of the house after suicide attempt by jumping from second floor window.  Code trauma B.  Vitals stable.  Differential includes ICH, fracture, anemia, acute psychosis. 28-year-old male found outside of the house after suicide attempt by jumping from second floor window.  Code trauma B.  Vitals stable.  Differential includes ICH, fracture, anemia, acute psychosis.  Labs and imaging independently reviewed and interpreted.  Psychiatry following with plan for inpatient hospitalization.

## 2024-01-13 NOTE — ED PROVIDER NOTE - NS ED ATTENDING STATEMENT MOD
I have seen and examined this patient and fully participated in the care of this patient as the teaching attending.  The service was shared with the RIYA.  I reviewed and verified the documentation.

## 2024-01-13 NOTE — ED BEHAVIORAL HEALTH ASSESSMENT NOTE - NSSUICPROTFACT_PSY_ALL_CORE
girlfriend/Responsibility to children, family, or others/Identifies reasons for living/Fear of death or the actual act of killing self

## 2024-01-13 NOTE — ED PROVIDER NOTE - CARE PLAN
1 Principal Discharge DX:	2019 novel coronavirus disease (COVID-19)  Secondary Diagnosis:	Major depressive episode

## 2024-01-13 NOTE — H&P ADULT - NS ATTEND AMEND GEN_ALL_CORE FT
28 year-old  MTF level B trauma s/p fall ~ 15 feet     Survey:   - Airway intact, equal bilateral breath sounds present, intact central GCS 13-15. Patient exposed and examined.    Findings:   - Forehead hematoma   - Scatter abrasions   - No acute traumatic findings on cross sectional imaging       Psych evaluation for SI  Safe disposition per ED   TTS

## 2024-01-13 NOTE — H&P ADULT - ASSESSMENT
27 yo male transitioned to female presents after the patient was found on the ground outside of the house by family, who reports the patient jumped from the second floor window in a suicide attempt followed by self-harm by knife.     Plan:   - CT imaging: no acute traumatic findings   - Continue management per ED   - No surgical Interventional  29 yo male transitioned to female presents after the patient was found on the ground outside of the house by family, who reports the patient jumped from the second floor window in a suicide attempt followed by self-harm by knife.     Plan:   - CT imaging: no acute traumatic findings    - No trauma surgery needs  - Patient is cleared for trauma perspective for any further medical management necessary 27 yo male transitioned to female presents after the patient was found on the ground outside of the house by family, who reports the patient jumped from the second floor window in a suicide attempt followed by self-harm by knife.     Plan:   - CT imaging: no acute traumatic findings    - No trauma surgery needs  - Patient is cleared for trauma perspective for any further medical management necessary

## 2024-01-13 NOTE — ED ADULT NURSE NOTE - OBJECTIVE STATEMENT
Patient arrived to  in yellow gown, patient was medically cleared by attending from main ED. Patient was wand by security for contraband, was compliant with process. Patient noted with poor eyes contact, flat affect. Patient denied visual and auditory hallucinations, reported suicidal thoughts. Patient was brought in by family after ingestion of some acid substance drug secondary to pain in the genital area. Patient jumped from 2 floors after ingestion of the substance. No injuries. Patient reported skin picking habit, many cicatrices noted to his leg, his hand. Patient denied any previous hospitalization at [facility], protocols were explained to patient , he verbalized understanding an he agreed with the plan, no safety concern to report at this time. Patient arrived to  in yellow gown, patient was medically cleared by attending from main ED. Patient was wand by security for contraband, was compliant with process. Patient noted with poor eyes contact, flat affect. Patient denied visual and auditory hallucinations, reported suicidal thoughts. Patient was brought in by family after ingestion of some acid substance drug secondary to pain in the genital area. Patient jumped from 2 floors after ingestion of the substance. No injuries. Patient reported skin picking habit, many cicatrices noted to his leg, his hand. Patient denied any previous hospitalization at [facility], protocols were explained to patient , he verbalized understanding an he agreed with the plan, no safety concern to report at this time.

## 2024-01-13 NOTE — H&P ADULT - NSHPLABSRESULTS_GEN_ALL_CORE
LABS  CBC (01-13 @ 13:38)                              13.1                           10.20   )----------------(  284        69.7  % Neutrophils, 15.9  % Lymphocytes, ANC: 7.12                                37.3<L>    BMP (01-13 @ 13:38)             138     |  104     |  9.5   		Ca++ --      Ca 9.2                ---------------------------------( 109<H>		Mg --                 3.3<L>  |  19.0<L>  |  0.89  			Ph --        LFTs (01-13 @ 13:38)      TPro 7.1 / Alb 4.4 / TBili 0.5 / DBili -- / AST 28 / ALT 18 / AlkPhos 71    Coags (01-13 @ 13:38)  aPTT 27.9 / INR 1.05 / PT 11.6      VBG (01-13 @ 14:28)     7.370 / 38<L> / 89<H> / 22 / -3.3<L> / 98.4%     Lactate: --    --------------------------------------------------------------------------------------------    MICROBIOLOGY  Urinalysis (01-13 @ 13:38):     Color:  / Appearance:  / SG:  / pH:  / Gluc: 109<H> / Ketones:  / Bili:  / Urobili:  / Protein : / Nitrites:  / Leuk.Est:  / RBC:  / WBC:  / Sq Epi:  / Non Sq Epi:  / Bacteria        --------------------------------------------------------------------------------------------    IMAGING  IMPRESSIONS:    Head CT: No CT evidence of acute intracranial hemorrhage.    C-spine CT:  No acute fracture.    CTA COW:  Patent intracranial circulation withoutflow limiting stenosis.    CTA NECK: Patent, ECAs, ICAs, no  hemodynamically significant stenosis at    ICA origins by NASCET criteria.  Bilateral vertebral arteries are patent without flow limiting stenosis.    CT C/A/P  IMPRESSION:  No acute traumatic findings.

## 2024-01-13 NOTE — H&P ADULT - CLICK TO LAUNCH ORM
Please call patient with the lab results showing stable kidney function, normal vitamin-D level, no evidence of proteinuria.  Plasma aldosterone/renin activity ratio on plasma metanephrine are not done yet.  
.
No

## 2024-01-13 NOTE — ED ADULT NURSE REASSESSMENT NOTE - NS ED NURSE REASSESS COMMENT FT1
Patient arrived to  in yellow gown, patient was medically cleared by attending from main ED. Patient was wand by security for contraband, was compliant with process. Patient noted with poor eyes contact, flat affect. Patient denied visual and auditory hallucinations, reported suicidal thoughts. Patient was brought in by family after ingestion of some acid substance drug secondary to pain in the genital area. Patient jumped from 2 floors after ingestion of the substance. No injuries. Patient reported skin picking habit, many cicatrices noted to his leg, his hand. Patient denied any previous hospitalization at [facility], protocols were explained to patient , he verbalized understanding an he agreed with the plan, no safety concern to report at this time. Patient arrived to  in yellow gown, patient was medically cleared by attending from main ED. Patient was wand by security for contraband, was compliant with process. Patient noted with poor eyes contact, flat affect. Patient denied visual and auditory hallucinations, reported suicidal thoughts. Patient was brought in by family after ingestion of some acid substance drug secondary to pain in the genital area. Patient jumped from 2 floors after ingestion of the substance. No injuries. Patient reported skin picking habit, many cicatrices noted to his leg, his hand. Patient denied any previous hospitalization at [facility], protocols were explained to patient , he verbalized understanding an he agreed with the plan, no safety concern to report at this time.

## 2024-01-13 NOTE — ED BEHAVIORAL HEALTH ASSESSMENT NOTE - SUMMARY
28 year old male to female; prefers to be called "Kassy"; domiciled with mother; single; noncaregiver; past psychiatric history of depression; prior psychiatric  hospitalizations; multiple prior suicide attempts; no known history of violence or arrests; recent use of acid; PMH of hormone therapy; brought in by EMS; called by family; presenting after jumping from second floor balcony; in the setting of taking acid yesterday.     Upon assessment, pt presents extremely tearful, hopeless and remorseful for his actions but also frustrated. Given pt's current presentation and reported multiple attempts at suicide, pt remains an acute danger to self and will be held for transfer pending bed availability.

## 2024-01-13 NOTE — ED BEHAVIORAL HEALTH ASSESSMENT NOTE - DESCRIPTION
hx hormone therapy and surgery Domiciled with mother, recent use if acid No acute events thus far.   ICU Vital Signs Last 24 Hrs  T(C): 37 (13 Jan 2024 15:43), Max: 37 (13 Jan 2024 15:43)  T(F): 98.6 (13 Jan 2024 15:43), Max: 98.6 (13 Jan 2024 15:43)  HR: 94 (13 Jan 2024 15:43) (94 - 103)  BP: 125/56 (13 Jan 2024 15:43) (125/56 - 134/82)  BP(mean): --  ABP: --  ABP(mean): --  RR: 18 (13 Jan 2024 15:43) (18 - 18)  SpO2: 99% (13 Jan 2024 15:43) (99% - 99%)    O2 Parameters below as of 13 Jan 2024 15:43  Patient On (Oxygen Delivery Method): room air

## 2024-01-14 DIAGNOSIS — U07.1 COVID-19: ICD-10-CM

## 2024-01-14 LAB
ANION GAP SERPL CALC-SCNC: 13 MMOL/L — SIGNIFICANT CHANGE UP (ref 5–17)
ANION GAP SERPL CALC-SCNC: 13 MMOL/L — SIGNIFICANT CHANGE UP (ref 5–17)
BUN SERPL-MCNC: 10.1 MG/DL — SIGNIFICANT CHANGE UP (ref 8–20)
BUN SERPL-MCNC: 10.1 MG/DL — SIGNIFICANT CHANGE UP (ref 8–20)
CALCIUM SERPL-MCNC: 9 MG/DL — SIGNIFICANT CHANGE UP (ref 8.4–10.5)
CALCIUM SERPL-MCNC: 9 MG/DL — SIGNIFICANT CHANGE UP (ref 8.4–10.5)
CHLORIDE SERPL-SCNC: 103 MMOL/L — SIGNIFICANT CHANGE UP (ref 96–108)
CHLORIDE SERPL-SCNC: 103 MMOL/L — SIGNIFICANT CHANGE UP (ref 96–108)
CO2 SERPL-SCNC: 22 MMOL/L — SIGNIFICANT CHANGE UP (ref 22–29)
CO2 SERPL-SCNC: 22 MMOL/L — SIGNIFICANT CHANGE UP (ref 22–29)
CREAT SERPL-MCNC: 0.82 MG/DL — SIGNIFICANT CHANGE UP (ref 0.5–1.3)
CREAT SERPL-MCNC: 0.82 MG/DL — SIGNIFICANT CHANGE UP (ref 0.5–1.3)
EGFR: 123 ML/MIN/1.73M2 — SIGNIFICANT CHANGE UP
EGFR: 123 ML/MIN/1.73M2 — SIGNIFICANT CHANGE UP
GLUCOSE SERPL-MCNC: 83 MG/DL — SIGNIFICANT CHANGE UP (ref 70–99)
GLUCOSE SERPL-MCNC: 83 MG/DL — SIGNIFICANT CHANGE UP (ref 70–99)
POTASSIUM SERPL-MCNC: 3.8 MMOL/L — SIGNIFICANT CHANGE UP (ref 3.5–5.3)
POTASSIUM SERPL-MCNC: 3.8 MMOL/L — SIGNIFICANT CHANGE UP (ref 3.5–5.3)
POTASSIUM SERPL-SCNC: 3.8 MMOL/L — SIGNIFICANT CHANGE UP (ref 3.5–5.3)
POTASSIUM SERPL-SCNC: 3.8 MMOL/L — SIGNIFICANT CHANGE UP (ref 3.5–5.3)
SODIUM SERPL-SCNC: 138 MMOL/L — SIGNIFICANT CHANGE UP (ref 135–145)
SODIUM SERPL-SCNC: 138 MMOL/L — SIGNIFICANT CHANGE UP (ref 135–145)

## 2024-01-14 PROCEDURE — 99222 1ST HOSP IP/OBS MODERATE 55: CPT

## 2024-01-14 PROCEDURE — 99231 SBSQ HOSP IP/OBS SF/LOW 25: CPT

## 2024-01-14 RX ORDER — ONDANSETRON 8 MG/1
4 TABLET, FILM COATED ORAL EVERY 8 HOURS
Refills: 0 | Status: DISCONTINUED | OUTPATIENT
Start: 2024-01-14 | End: 2024-01-23

## 2024-01-14 RX ORDER — ENOXAPARIN SODIUM 100 MG/ML
40 INJECTION SUBCUTANEOUS EVERY 24 HOURS
Refills: 0 | Status: DISCONTINUED | OUTPATIENT
Start: 2024-01-14 | End: 2024-01-15

## 2024-01-14 RX ORDER — GABAPENTIN 400 MG/1
300 CAPSULE ORAL THREE TIMES A DAY
Refills: 0 | Status: DISCONTINUED | OUTPATIENT
Start: 2024-01-14 | End: 2024-01-19

## 2024-01-14 RX ORDER — LANOLIN ALCOHOL/MO/W.PET/CERES
3 CREAM (GRAM) TOPICAL AT BEDTIME
Refills: 0 | Status: DISCONTINUED | OUTPATIENT
Start: 2024-01-14 | End: 2024-01-23

## 2024-01-14 RX ORDER — ACETAMINOPHEN 500 MG
650 TABLET ORAL EVERY 6 HOURS
Refills: 0 | Status: DISCONTINUED | OUTPATIENT
Start: 2024-01-14 | End: 2024-01-23

## 2024-01-14 RX ORDER — GABAPENTIN 400 MG/1
0 CAPSULE ORAL
Refills: 0 | DISCHARGE

## 2024-01-14 RX ADMIN — Medication 650 MILLIGRAM(S): at 14:33

## 2024-01-14 RX ADMIN — Medication 650 MILLIGRAM(S): at 13:33

## 2024-01-14 RX ADMIN — GABAPENTIN 300 MILLIGRAM(S): 400 CAPSULE ORAL at 13:33

## 2024-01-14 RX ADMIN — BUPROPION HYDROCHLORIDE 300 MILLIGRAM(S): 150 TABLET, EXTENDED RELEASE ORAL at 13:32

## 2024-01-14 RX ADMIN — GABAPENTIN 300 MILLIGRAM(S): 400 CAPSULE ORAL at 21:59

## 2024-01-14 RX ADMIN — Medication 3 MILLIGRAM(S): at 21:59

## 2024-01-14 RX ADMIN — ENOXAPARIN SODIUM 40 MILLIGRAM(S): 100 INJECTION SUBCUTANEOUS at 15:50

## 2024-01-14 NOTE — CONSULT NOTE ADULT - ASSESSMENT
27 yo transgender female presents after the patient was found on the ground outside of the house by family on 01/13/24, who reports the patient jumped from the second floor window in a suicide attempt followed by self-harm by knife. Patient was seen by trauma service and had necessary imaging which is negative for acute findings. patient was seen by psych and committed for inpatient psych. today her COIVD test came back positive and inpatient psych facility is unable to accept her with COVID positive status so she is being admitted to hospital. patient says she actually tested positive 7 days ago. she denies any other complaints apart from pain form fall. she does not have any COVID related symptoms.    #. COVID 19 infection. patient say she tested positive 7 days ago. asymptomatic now. will stay in patient until accepted by psych facillity    #. Depression with suicide attempt by jumping off the second floor and then stabbing herself with knife. patient has been committed for inpatient psych facility pending completion of quarantining for COVID c/w wellbutrin    #. DVT prophylaxis: lovenox    #. Dispo: inpatient psych  29 yo transgender female presents after the patient was found on the ground outside of the house by family on 01/13/24, who reports the patient jumped from the second floor window in a suicide attempt followed by self-harm by knife. Patient was seen by trauma service and had necessary imaging which is negative for acute findings. patient was seen by psych and committed for inpatient psych. today her COIVD test came back positive and inpatient psych facility is unable to accept her with COVID positive status so she is being admitted to hospital. patient says she actually tested positive 7 days ago. she denies any other complaints apart from pain form fall. she does not have any COVID related symptoms.    #. COVID 19 infection. patient say she tested positive 7 days ago. asymptomatic now. will stay in patient until accepted by psych facillity    #. Depression with suicide attempt by jumping off the second floor and then stabbing herself with knife. patient has been committed for inpatient psych facility pending completion of quarantining for COVID c/w wellbutrin    #. DVT prophylaxis: lovenox    #. Dispo: inpatient psych

## 2024-01-14 NOTE — ED ADULT NURSE REASSESSMENT NOTE - NS ED NURSE REASSESS COMMENT FT1
patient returned to main ED due to testing +COVID. patient brought to isolation room, remains in yellow gown with belongings secured, placed on 1:1 while awaiting psych consult.

## 2024-01-14 NOTE — ED ADULT NURSE REASSESSMENT NOTE - NS ED NURSE REASSESS COMMENT FT1
plan of care reassumed @ 0300 from Andra LIAO. no S&S of acute distress. denies SI/HI. pt placed in isolation room d/t covid positive result. awaiting impatient psych transfer. constant observation ongoing.

## 2024-01-14 NOTE — PROGRESS NOTE ADULT - SUBJECTIVE AND OBJECTIVE BOX
Tertiary Trauma Survey (TTS)    Date of TTS: 01-14-24 @ 15:09                             Admit Date: 01-14-24 @ 12:12      Trauma Activation: B    List Injuries Identified to Date:         List Operative and Interventional Radiological Procedures:         Vital Signs Last 24 Hrs  T(C): 37 (14 Jan 2024 11:18), Max: 37.2 (14 Jan 2024 07:30)  T(F): 98.6 (14 Jan 2024 11:18), Max: 98.9 (14 Jan 2024 07:30)  HR: 78 (14 Jan 2024 11:18) (68 - 94)  BP: 126/76 (14 Jan 2024 11:18) (120/70 - 136/63)  BP(mean): --  RR: 18 (14 Jan 2024 11:18) (16 - 18)  SpO2: 98% (14 Jan 2024 11:18) (96% - 99%)    Parameters below as of 14 Jan 2024 11:18  Patient On (Oxygen Delivery Method): room air      Physical Exam:    Neuro: [x] non focal neurological exam [ ] focal neurological deficits noted to be:     HEENT: [x] normo-cephalic/atraumatic     Pulm/Chest:     Cardiac:     GI / Abdomen:     Musculoskeletal / Extremities:    Integumentary:       RADIOLOGICAL FINDINGS REVIEW:    INCIDENTAL FINDINGS:    [ ] No    [ ] Yes, Findings are:        [ ] Tertiary exam complete, there are no new injuries identified    [ ] Tertiary exam done, new injuries identified are:                [ ] Imaging ordered:  Tertiary Trauma Survey (TTS)    Date of TTS: 01-14-24 @ 15:09                             Admit Date: 01-14-24 @ 12:12      Trauma Activation: B      Vital Signs Last 24 Hrs  T(C): 37 (14 Jan 2024 11:18), Max: 37.2 (14 Jan 2024 07:30)  T(F): 98.6 (14 Jan 2024 11:18), Max: 98.9 (14 Jan 2024 07:30)  HR: 78 (14 Jan 2024 11:18) (68 - 94)  BP: 126/76 (14 Jan 2024 11:18) (120/70 - 136/63)  BP(mean): --  RR: 18 (14 Jan 2024 11:18) (16 - 18)  SpO2: 98% (14 Jan 2024 11:18) (96% - 99%)    Parameters below as of 14 Jan 2024 11:18  Patient On (Oxygen Delivery Method): room air      Physical Exam:    Neuro: [x] non focal neurological exam [ ] focal neurological deficits noted to be:     HEENT: [x] normo-cephalic/atraumatic [ ] Abnormalities noted to be:    Pulm/Chest: [x] CTA b/l [x] chest wall non tender [ ] abnormalities noted to be:     Cardiac: [x] S1S2, sinus rhythm [ ] abnormalities noted to be:     GI / Abdomen: [x] soft, non-tender, non-distended [ ] abnormalities noted to be:     Musculoskeletal / Extremities: [x] normal active ROM [ ] abnormalities noted to be:     Integumentary: scattered abrasions of varying ages       List Injuries Identified to Date: no traumatic injuries     List Operative and Interventional Radiological Procedures: N/A      RADIOLOGICAL FINDINGS REVIEW:  Head CT: no CT evidence of acute intracranial hemorrhage   C-spine CT: no acute fracture  CTA COW: intracranial circulation without flow limiting stenosis   CTA Neck: no hemodynamically significant stenosis at ICA origins by NASCET criteria. Bilateral vertebral arteries are patent without flow limited stenosis   CT C/A/P: No acute traumatic findings      INCIDENTAL FINDINGS:    [x] No    [ ] Yes, Findings are:        [x] Tertiary exam complete, there are no new injuries identified    [ ] Tertiary exam done, new injuries identified are:                [ ] Imaging ordered:      Tertiary Trauma Survey (TTS)    Date of TTS: 01-14-24 @ 15:09                             Admit Date: 01-14-24 @ 12:12      Trauma Activation: B      Vital Signs Last 24 Hrs  T(C): 37 (14 Jan 2024 11:18), Max: 37.2 (14 Jan 2024 07:30)  T(F): 98.6 (14 Jan 2024 11:18), Max: 98.9 (14 Jan 2024 07:30)  HR: 78 (14 Jan 2024 11:18) (68 - 94)  BP: 126/76 (14 Jan 2024 11:18) (120/70 - 136/63)  BP(mean): --  RR: 18 (14 Jan 2024 11:18) (16 - 18)  SpO2: 98% (14 Jan 2024 11:18) (96% - 99%)    Parameters below as of 14 Jan 2024 11:18  Patient On (Oxygen Delivery Method): room air      Physical Exam:    Neuro: [x] non focal neurological exam [ ] focal neurological deficits noted to be:     HEENT: [x] normo-cephalic/atraumatic [ ] Abnormalities noted to be:    Pulm/Chest: [x] CTA b/l [x] chest wall non tender [ ] abnormalities noted to be:     Cardiac: [x] S1S2, sinus rhythm [ ] abnormalities noted to be:     GI / Abdomen: [x] soft, non-tender, non-distended [ ] abnormalities noted to be:     Musculoskeletal / Extremities: [x] normal active ROM [ ] abnormalities noted to be:     Integumentary: scattered abrasions of varying ages       List Injuries Identified to Date: no traumatic injuries     List Operative and Interventional Radiological Procedures: N/A      RADIOLOGICAL FINDINGS REVIEW:  Head CT: no CT evidence of acute intracranial hemorrhage   C-spine CT: no acute fracture  CTA COW: intracranial circulation without flow limiting stenosis   CTA Neck: no hemodynamically significant stenosis at ICA origins by NASCET criteria. Bilateral vertebral arteries are patent without flow limited stenosis   CT C/A/P: No acute traumatic findings      INCIDENTAL FINDINGS:    [x] No    [ ] Yes, Findings are:        [x] Tertiary exam complete, there are no new injuries identified    [ ] Tertiary exam done, new injuries identified are:                [ ] Imaging ordered:     No traumatic injuries. Trauma team to sign off  Tertiary Trauma Survey (TTS)    Date of TTS: 01-14-24 @ 15:09                             Admit Date: 01-14-24 @ 12:12      Trauma Activation: B      Vital Signs Last 24 Hrs  T(C): 37 (14 Jan 2024 11:18), Max: 37.2 (14 Jan 2024 07:30)  T(F): 98.6 (14 Jan 2024 11:18), Max: 98.9 (14 Jan 2024 07:30)  HR: 78 (14 Jan 2024 11:18) (68 - 94)  BP: 126/76 (14 Jan 2024 11:18) (120/70 - 136/63)  BP(mean): --  RR: 18 (14 Jan 2024 11:18) (16 - 18)  SpO2: 98% (14 Jan 2024 11:18) (96% - 99%)    Parameters below as of 14 Jan 2024 11:18  Patient On (Oxygen Delivery Method): room air      Physical Exam:    Neuro: [x] non focal neurological exam [ ] focal neurological deficits noted to be:     HEENT: [x] normo-cephalic/atraumatic [ ] Abnormalities noted to be:    Pulm/Chest: [x] CTA b/l [x] chest wall non tender [ ] abnormalities noted to be:     Cardiac: [x] S1S2, sinus rhythm [ ] abnormalities noted to be:     GI / Abdomen: [x] soft, non-tender, non-distended [ ] abnormalities noted to be:     Musculoskeletal / Extremities: [x] normal active ROM [ ] abnormalities noted to be:     Integumentary: scattered abrasions of varying ages     CONSULTS:   Psych    List Injuries Identified to Date: no traumatic injuries     List Operative and Interventional Radiological Procedures: N/A      RADIOLOGICAL FINDINGS REVIEW:  Head CT: no CT evidence of acute intracranial hemorrhage   C-spine CT: no acute fracture  CTA COW: intracranial circulation without flow limiting stenosis   CTA Neck: no hemodynamically significant stenosis at ICA origins by NASCET criteria. Bilateral vertebral arteries are patent without flow limited stenosis   CT C/A/P: No acute traumatic findings      INCIDENTAL FINDINGS:    [x] No    [ ] Yes, Findings are:        [x] Tertiary exam complete, there are no new injuries identified    [ ] Tertiary exam done, new injuries identified are:                [ ] Imaging ordered:     No traumatic injuries. Trauma team to sign off

## 2024-01-15 PROCEDURE — 99232 SBSQ HOSP IP/OBS MODERATE 35: CPT

## 2024-01-15 RX ORDER — INFLUENZA VIRUS VACCINE 15; 15; 15; 15 UG/.5ML; UG/.5ML; UG/.5ML; UG/.5ML
0.5 SUSPENSION INTRAMUSCULAR ONCE
Refills: 0 | Status: COMPLETED | OUTPATIENT
Start: 2024-01-15 | End: 2024-01-15

## 2024-01-15 RX ADMIN — BUPROPION HYDROCHLORIDE 300 MILLIGRAM(S): 150 TABLET, EXTENDED RELEASE ORAL at 11:49

## 2024-01-15 RX ADMIN — GABAPENTIN 300 MILLIGRAM(S): 400 CAPSULE ORAL at 14:16

## 2024-01-15 RX ADMIN — GABAPENTIN 300 MILLIGRAM(S): 400 CAPSULE ORAL at 22:29

## 2024-01-15 RX ADMIN — Medication 650 MILLIGRAM(S): at 22:29

## 2024-01-15 RX ADMIN — GABAPENTIN 300 MILLIGRAM(S): 400 CAPSULE ORAL at 05:27

## 2024-01-15 RX ADMIN — Medication 650 MILLIGRAM(S): at 23:05

## 2024-01-15 NOTE — PROGRESS NOTE ADULT - SUBJECTIVE AND OBJECTIVE BOX
Infection due to severe acute respiratory syndrome coronavirus 2 (SARS-CoV-2)    HPI:  29 yo male transitioned to female presents after the patient was found on the ground outside of the house by family, who reports the patient jumped from the second floor window in a suicide attempt followed by self-harm by knife. Unknown PMH/PSH. (13 Jan 2024 13:47)    Interval History:  Patient was seen and examined at bedside around 10 am.  Feels OK.   Has some soreness on wrists from cuts.   Denies chest pain, palpitations, shortness of breath, headache, dizziness, visual symptoms, nausea, vomiting or abdominal pain.    ROS:  As per interval history otherwise unremarkable.    PHYSICAL EXAM:  Vital Signs   T(C): 36.8 (15 Casimiro 2024 12:39), Max: 36.9 (14 Jan 2024 15:49)  T(F): 98.2 (15 Casimiro 2024 12:39), Max: 98.5 (14 Jan 2024 15:49)  HR: 75 (15 Casimiro 2024 12:39) (63 - 82)  BP: 116/71 (15 Casimiro 2024 12:39) (113/66 - 155/74)  RR: 18 (15 Casimiro 2024 12:39) (18 - 20)  SpO2: 98% (15 Casimiro 2024 12:39) (95% - 100%)  Parameters below as of 15 Casimiro 2024 12:39  Patient On (Oxygen Delivery Method): room air  General: Young female lying in bed comfortably. No acute distress  HEENT: EOMI. Clear conjunctivae. Moist mucus membrane  Neck: Supple.   Chest: Good air entry. No wheezing, rales or rhonchi.   Heart: Normal S1 & S2. RRR.   Abdomen: Non distended. Soft. Non-tender. + BS  Ext: No pedal edema. No calf tenderness   Neuro: Awake and alert. No focal deficit. Speech clear.   Skin: Warm and Dry  Psychiatry: Normal mood and affect    LABS:    01-14    138  |  103  |  10.1  ----------------------------<  83  3.8   |  22.0  |  0.82    Ca    9.0      14 Jan 2024 13:41    Urinalysis Basic - ( 14 Jan 2024 13:41 )    Color: x / Appearance: x / SG: x / pH: x  Gluc: 83 mg/dL / Ketone: x  / Bili: x / Urobili: x   Blood: x / Protein: x / Nitrite: x   Leuk Esterase: x / RBC: x / WBC x   Sq Epi: x / Non Sq Epi: x / Bacteria: x    RADIOLOGY & ADDITIONAL STUDIES:  Reviewed     MEDICATIONS  (STANDING):  buPROPion XL (24-Hour) 300 milliGRAM(s) Oral daily  enoxaparin Injectable 40 milliGRAM(s) SubCutaneous every 24 hours  gabapentin 300 milliGRAM(s) Oral three times a day    MEDICATIONS  (PRN):  acetaminophen     Tablet .. 650 milliGRAM(s) Oral every 6 hours PRN Temp greater or equal to 38C (100.4F), Mild Pain (1 - 3)  aluminum hydroxide/magnesium hydroxide/simethicone Suspension 30 milliLiter(s) Oral every 4 hours PRN Dyspepsia  melatonin 3 milliGRAM(s) Oral at bedtime PRN Insomnia  ondansetron Injectable 4 milliGRAM(s) IV Push every 8 hours PRN Nausea and/or Vomiting       Infection due to severe acute respiratory syndrome coronavirus 2 (SARS-CoV-2)    HPI:  27 yo male transitioned to female presents after the patient was found on the ground outside of the house by family, who reports the patient jumped from the second floor window in a suicide attempt followed by self-harm by knife. Unknown PMH/PSH. (13 Jan 2024 13:47)    Interval History:  Patient was seen and examined at bedside around 10 am.  Feels OK.   Has some soreness on wrists from cuts.   Denies chest pain, palpitations, shortness of breath, headache, dizziness, visual symptoms, nausea, vomiting or abdominal pain.    ROS:  As per interval history otherwise unremarkable.    PHYSICAL EXAM:  Vital Signs   T(C): 36.8 (15 Casimiro 2024 12:39), Max: 36.9 (14 Jan 2024 15:49)  T(F): 98.2 (15 Casimiro 2024 12:39), Max: 98.5 (14 Jan 2024 15:49)  HR: 75 (15 Casimiro 2024 12:39) (63 - 82)  BP: 116/71 (15 Casimiro 2024 12:39) (113/66 - 155/74)  RR: 18 (15 Casimiro 2024 12:39) (18 - 20)  SpO2: 98% (15 Casimiro 2024 12:39) (95% - 100%)  Parameters below as of 15 Casimiro 2024 12:39  Patient On (Oxygen Delivery Method): room air  General: Young female lying in bed comfortably. No acute distress  HEENT: EOMI. Clear conjunctivae. Moist mucus membrane  Neck: Supple.   Chest: Good air entry. No wheezing, rales or rhonchi.   Heart: Normal S1 & S2. RRR.   Abdomen: Non distended. Soft. Non-tender. + BS  Ext: No pedal edema. No calf tenderness   Neuro: Awake and alert. No focal deficit. Speech clear.   Skin: Warm and Dry  Psychiatry: Normal mood and affect    LABS:    01-14    138  |  103  |  10.1  ----------------------------<  83  3.8   |  22.0  |  0.82    Ca    9.0      14 Jan 2024 13:41    Urinalysis Basic - ( 14 Jan 2024 13:41 )    Color: x / Appearance: x / SG: x / pH: x  Gluc: 83 mg/dL / Ketone: x  / Bili: x / Urobili: x   Blood: x / Protein: x / Nitrite: x   Leuk Esterase: x / RBC: x / WBC x   Sq Epi: x / Non Sq Epi: x / Bacteria: x    RADIOLOGY & ADDITIONAL STUDIES:  Reviewed     MEDICATIONS  (STANDING):  buPROPion XL (24-Hour) 300 milliGRAM(s) Oral daily  enoxaparin Injectable 40 milliGRAM(s) SubCutaneous every 24 hours  gabapentin 300 milliGRAM(s) Oral three times a day    MEDICATIONS  (PRN):  acetaminophen     Tablet .. 650 milliGRAM(s) Oral every 6 hours PRN Temp greater or equal to 38C (100.4F), Mild Pain (1 - 3)  aluminum hydroxide/magnesium hydroxide/simethicone Suspension 30 milliLiter(s) Oral every 4 hours PRN Dyspepsia  melatonin 3 milliGRAM(s) Oral at bedtime PRN Insomnia  ondansetron Injectable 4 milliGRAM(s) IV Push every 8 hours PRN Nausea and/or Vomiting

## 2024-01-15 NOTE — PATIENT PROFILE ADULT - LIFE CHALLENGES - DETAILS
patient requesting to speak with SW for aid in having an out patient therapist. Currently sees one out patient via telehealth but is looking for someone local.

## 2024-01-15 NOTE — PROGRESS NOTE ADULT - ASSESSMENT
29 yo male transitioned to female presented after the patient was found on the ground outside of the house by family on 01/13/24 -- patient jumped from the second floor window in a suicide attempt followed by self-harm by knife. Patient was seen by trauma service and had necessary imaging which was negative for acute findings. patient was seen by psych and was recommended inpatient psych. COIVD test came back positive and inpatient psych facility is unable to accept her with COVID positive status so she was admitted to hospital. Per patient, she was tested positive 7 days ago. she does not have any COVID related symptoms.    #. COVID 19 infection  Per patient -- tested positive 7 days ago.   Asymptomatic.  Supportive care    #. Depression with suicide attempt by jumping off the second floor and then stabbing herself with knife  Seen by Psych, recommended inpatient psych  c/w Wellbutrin  Constant Observation     #. DVT prophylaxis: Early ambulation    #. Dispo: Inpatient psych once accepted.      27 yo male transitioned to female presented after the patient was found on the ground outside of the house by family on 01/13/24 -- patient jumped from the second floor window in a suicide attempt followed by self-harm by knife. Patient was seen by trauma service and had necessary imaging which was negative for acute findings. patient was seen by psych and was recommended inpatient psych. COIVD test came back positive and inpatient psych facility is unable to accept her with COVID positive status so she was admitted to hospital. Per patient, she was tested positive 7 days ago. she does not have any COVID related symptoms.    #. COVID 19 infection  Per patient -- tested positive 7 days ago.   Asymptomatic.  Supportive care    #. Depression with suicide attempt by jumping off the second floor and then stabbing herself with knife  Seen by Psych, recommended inpatient psych  c/w Wellbutrin  Constant Observation     #. DVT prophylaxis: Early ambulation    #. Dispo: Inpatient psych once accepted.

## 2024-01-15 NOTE — PATIENT PROFILE ADULT - FALL HARM RISK - UNIVERSAL INTERVENTIONS
Bed in lowest position, wheels locked, appropriate side rails in place/Call bell, personal items and telephone in reach/Instruct patient to call for assistance before getting out of bed or chair/Non-slip footwear when patient is out of bed/[place] to call system/Physically safe environment - no spills, clutter or unnecessary equipment/Purposeful Proactive Rounding/Room/bathroom lighting operational, light cord in reach

## 2024-01-16 LAB — SARS-COV-2 RNA SPEC QL NAA+PROBE: SIGNIFICANT CHANGE UP

## 2024-01-16 PROCEDURE — 99232 SBSQ HOSP IP/OBS MODERATE 35: CPT

## 2024-01-16 RX ADMIN — GABAPENTIN 300 MILLIGRAM(S): 400 CAPSULE ORAL at 14:37

## 2024-01-16 RX ADMIN — GABAPENTIN 300 MILLIGRAM(S): 400 CAPSULE ORAL at 05:16

## 2024-01-16 RX ADMIN — Medication 3 MILLIGRAM(S): at 03:23

## 2024-01-16 RX ADMIN — GABAPENTIN 300 MILLIGRAM(S): 400 CAPSULE ORAL at 21:35

## 2024-01-16 RX ADMIN — BUPROPION HYDROCHLORIDE 300 MILLIGRAM(S): 150 TABLET, EXTENDED RELEASE ORAL at 11:22

## 2024-01-16 NOTE — PROGRESS NOTE ADULT - SUBJECTIVE AND OBJECTIVE BOX
Infection due to severe acute respiratory syndrome coronavirus 2 (SARS-CoV-2)    HPI:  29 yo male transitioned to female presents after the patient was found on the ground outside of the house by family, who reports the patient jumped from the second floor window in a suicide attempt followed by self-harm by knife. Unknown PMH/PSH. (13 Jan 2024 13:47)    Interval History:  Patient was seen and examined at bedside around 10:15 am.  Doing well.   No active complaints.  Mood is good.  No suicidal or homicidal ideation.    ROS:  As per interval history otherwise unremarkable.    PHYSICAL EXAM:  Vital Signs   T(C): 37 (16 Jan 2024 16:30), Max: 37 (16 Jan 2024 16:30)  T(F): 98.6 (16 Jan 2024 16:30), Max: 98.6 (16 Jan 2024 16:30)  HR: 71 (16 Jan 2024 16:30) (71 - 78)  BP: 121/73 (16 Jan 2024 16:30) (114/68 - 121/73)  RR: 18 (16 Jan 2024 16:30) (18 - 20)  SpO2: 98% (16 Jan 2024 16:30) (97% - 99%)  Parameters below as of 16 Jan 2024 08:29  Patient On (Oxygen Delivery Method): room air  General: Young female lying in bed comfortably. No acute distress  HEENT: EOMI. Clear conjunctivae. Moist mucus membrane  Neck: Supple.   Chest: Good air entry. No wheezing, rales or rhonchi.   Heart: Normal S1 & S2. RRR.   Abdomen: Non distended. Soft. Non-tender. + BS  Ext: No pedal edema. No calf tenderness   Neuro: Awake and alert. No focal deficit. Speech clear.   Skin: Warm and Dry  Psychiatry: Normal mood and affect    LABS:    01-14    138  |  103  |  10.1  ----------------------------<  83  3.8   |  22.0  |  0.82    Ca    9.0      14 Jan 2024 13:41    Urinalysis Basic - ( 14 Jan 2024 13:41 )    Color: x / Appearance: x / SG: x / pH: x  Gluc: 83 mg/dL / Ketone: x  / Bili: x / Urobili: x   Blood: x / Protein: x / Nitrite: x   Leuk Esterase: x / RBC: x / WBC x   Sq Epi: x / Non Sq Epi: x / Bacteria: x    RADIOLOGY & ADDITIONAL STUDIES:  Reviewed     MEDICATIONS  (STANDING):  buPROPion XL (24-Hour) 300 milliGRAM(s) Oral daily  gabapentin 300 milliGRAM(s) Oral three times a day    MEDICATIONS  (PRN):  acetaminophen     Tablet .. 650 milliGRAM(s) Oral every 6 hours PRN Temp greater or equal to 38C (100.4F), Mild Pain (1 - 3)  aluminum hydroxide/magnesium hydroxide/simethicone Suspension 30 milliLiter(s) Oral every 4 hours PRN Dyspepsia  melatonin 3 milliGRAM(s) Oral at bedtime PRN Insomnia  ondansetron Injectable 4 milliGRAM(s) IV Push every 8 hours PRN Nausea and/or Vomiting

## 2024-01-16 NOTE — PROGRESS NOTE ADULT - ASSESSMENT
29 yo male transitioned to female presented after the patient was found on the ground outside of the house by family on 01/13/24 -- patient jumped from the second floor window in a suicide attempt followed by self-harm by knife. Patient was seen by trauma service and had necessary imaging which was negative for acute findings. patient was seen by psych and was recommended inpatient psych. COIVD test came back positive and inpatient psych facility is unable to accept her with COVID positive status so she was admitted to hospital. Per patient, she was tested positive 7 days ago. she does not have any COVID related symptoms.    #. COVID 19 infection  Per patient -- tested positive 7 days ago.   Asymptomatic.  Supportive care    #. Depression with suicide attempt by jumping off the second floor and then stabbing herself with knife  Seen by Psych, recommended inpatient psych  c/w Wellbutrin  Constant Observation     #. DVT prophylaxis: Early ambulation    #. Dispo: Inpatient psych once accepted.

## 2024-01-17 LAB — SARS-COV-2 RNA SPEC QL NAA+PROBE: SIGNIFICANT CHANGE UP

## 2024-01-17 PROCEDURE — 99233 SBSQ HOSP IP/OBS HIGH 50: CPT

## 2024-01-17 PROCEDURE — 99232 SBSQ HOSP IP/OBS MODERATE 35: CPT

## 2024-01-17 RX ADMIN — BUPROPION HYDROCHLORIDE 300 MILLIGRAM(S): 150 TABLET, EXTENDED RELEASE ORAL at 13:08

## 2024-01-17 RX ADMIN — Medication 650 MILLIGRAM(S): at 05:50

## 2024-01-17 RX ADMIN — GABAPENTIN 300 MILLIGRAM(S): 400 CAPSULE ORAL at 13:13

## 2024-01-17 RX ADMIN — Medication 8 MILLIGRAM(S): at 18:41

## 2024-01-17 RX ADMIN — GABAPENTIN 300 MILLIGRAM(S): 400 CAPSULE ORAL at 22:23

## 2024-01-17 RX ADMIN — Medication 650 MILLIGRAM(S): at 05:21

## 2024-01-17 RX ADMIN — GABAPENTIN 300 MILLIGRAM(S): 400 CAPSULE ORAL at 05:15

## 2024-01-17 RX ADMIN — Medication 650 MILLIGRAM(S): at 22:23

## 2024-01-17 RX ADMIN — Medication 650 MILLIGRAM(S): at 22:53

## 2024-01-17 NOTE — PROGRESS NOTE ADULT - ASSESSMENT
29 yo male transitioned to female presented after the patient was found on the ground outside of the house by family on 01/13/24 -- patient jumped from the second floor window in a suicide attempt followed by self-harm by knife. Patient was seen by trauma service and had necessary imaging which was negative for acute findings. patient was seen by psych and was recommended inpatient psych. COIVD test came back positive and inpatient psych facility is unable to accept her with COVID positive status so she was admitted to hospital. Per patient, she was tested positive 7 days ago. she does not have any COVID related symptoms.    #. COVID 19 infection  Per patient -- tested positive 7 days ago.   Asymptomatic.  Supportive care    #. Depression with suicide attempt by jumping off the second floor and then stabbing herself with knife  Seen by Psych, recommended inpatient psych  c/w Wellbutrin  Constant Observation     #. Gender Change Surgery  Patient takes Medroxyprogesterone Inj every 3 months (got last dose a month ago) and Estradiol 40 mg/ml (0.2 ml IM every 2 weeks) -- she is due for this now, will give a dose while she is here. Discussed with Pharmacy.  Patient needs post op care for her surgery which was done on 3/6/23 by Dr. Olivia Meléndez ([phone]), called their office and discussed with covering provider who recommended continuing care at this time. She uses dilators (made of acrylic) and has at bedside. She is allowed to continue post op care while hospitalized.     #. DVT prophylaxis: Early ambulation    #. Dispo: Inpatient psych once accepted.

## 2024-01-17 NOTE — PROGRESS NOTE ADULT - SUBJECTIVE AND OBJECTIVE BOX
Infection due to severe acute respiratory syndrome coronavirus 2 (SARS-CoV-2)    HPI:  29 yo male transitioned to female presents after the patient was found on the ground outside of the house by family, who reports the patient jumped from the second floor window in a suicide attempt followed by self-harm by knife. Unknown PMH/PSH. (13 Jan 2024 13:47)    Interval History:  Patient was seen and examined at bedside around 9:45 am.  Feels better.   No active complaints.  Mood is good.  No suicidal or homicidal ideation.    ROS:  As per interval history otherwise unremarkable.    PHYSICAL EXAM:  Vital Signs  T(C): 36.7 (17 Jan 2024 11:02), Max: 37 (16 Jan 2024 16:30)  T(F): 98 (17 Jan 2024 11:02), Max: 98.6 (16 Jan 2024 16:30)  HR: 66 (17 Jan 2024 11:02) (66 - 82)  BP: 144/87 (17 Jan 2024 11:02) (108/67 - 144/87)  RR: 17 (17 Jan 2024 11:02) (17 - 18)  SpO2: 100% (17 Jan 2024 11:02) (98% - 100%)  Parameters below as of 17 Jan 2024 11:02  Patient On (Oxygen Delivery Method): room air  General: Young female sitting in bed comfortably. No acute distress  HEENT: EOMI. Clear conjunctivae. Moist mucus membrane  Neck: Supple.   Chest: Good air entry. No wheezing, rales or rhonchi.   Heart: Normal S1 & S2. RRR.   Abdomen: Non distended. Soft. Non-tender. + BS  Ext: No pedal edema. No calf tenderness   Neuro: Awake and alert. No focal deficit. Speech clear.   Skin: Warm and Dry  Psychiatry: Normal mood and affect    LABS:    01-14    138  |  103  |  10.1  ----------------------------<  83  3.8   |  22.0  |  0.82    Ca    9.0      14 Jan 2024 13:41    Urinalysis Basic - ( 14 Jan 2024 13:41 )    Color: x / Appearance: x / SG: x / pH: x  Gluc: 83 mg/dL / Ketone: x  / Bili: x / Urobili: x   Blood: x / Protein: x / Nitrite: x   Leuk Esterase: x / RBC: x / WBC x   Sq Epi: x / Non Sq Epi: x / Bacteria: x    RADIOLOGY & ADDITIONAL STUDIES:  Reviewed     MEDICATIONS  (STANDING):  buPROPion XL (24-Hour) 300 milliGRAM(s) Oral daily  gabapentin 300 milliGRAM(s) Oral three times a day    MEDICATIONS  (PRN):  acetaminophen     Tablet .. 650 milliGRAM(s) Oral every 6 hours PRN Temp greater or equal to 38C (100.4F), Mild Pain (1 - 3)  aluminum hydroxide/magnesium hydroxide/simethicone Suspension 30 milliLiter(s) Oral every 4 hours PRN Dyspepsia  melatonin 3 milliGRAM(s) Oral at bedtime PRN Insomnia  ondansetron Injectable 4 milliGRAM(s) IV Push every 8 hours PRN Nausea and/or Vomiting

## 2024-01-17 NOTE — BH CONSULTATION LIAISON PROGRESS NOTE - NSBHASSESSMENTFT_PSY_ALL_CORE
28 year old transgender female; prefers to be called "Kassy"; domiciled with mother; single; noncaregiver; past psychiatric history of depression; prior psychiatric  hospitalizations; multiple prior suicide attempts; no known history of violence or arrests; recent use of acid; PMH of hormone therapy; brought in by EMS; called by family; presenting after jumping from second floor balcony; in the setting of taking acid yesterday.     Upon assessment, pt presents extremely tearful, hopeless and remorseful for his actions but also frustrated. Given pt's current presentation and reported multiple attempts at suicide, pt remains an acute danger to self and will be held for transfer pending bed availability.    1/17: patient seen for follow up and found to be calm, cooperative. patient currently remorseful but still with depressed mood and requesting inpatient psych transfer once covid quarantine is complete.     Recs   -1 to 1 observation  -Continue Wellbutrin 300mg po daily  -Plan for inpatient psych transfer once COVID quarantine is complete unless patient improves.

## 2024-01-17 NOTE — BH CONSULTATION LIAISON PROGRESS NOTE - NSBHFUPINTERVALHXFT_PSY_A_CORE
Patient is a 28 year old transgender female prefers to be called "Kassy"; domiciled with mother; single; noncaregiver; past psychiatric history of depression; prior psychiatric  hospitalizations; multiple prior suicide attempts; no known history of violence or arrests; recent use of acid and MDMA; PMH of hormone therapy; brought in by EMS; called by family; presenting after jumping from second floor balcony;    Patient seen for follow up and found to be calm and cooperative. patient explains how her mental health was not doing well the last several weeks. patient explains how she was having stress and pain related to her "bottom surgery" and explains how she was self medicating with MDMA and Acid and realizes she went days without taking her wellbutrin and remembers having suicidal thoughts. She explains how day of presentation, she took acid and was having uncontrolled thoughts of suicide and tried to stab herself with a knife but ws stopped by mother and also jumped from a balcony but did not sustain any injury and eventually was taken to the hospital. patient denying any current s/h ideation but does report depression and periods where she has passive thouights of death. and with no reported AVH.

## 2024-01-18 PROCEDURE — 99232 SBSQ HOSP IP/OBS MODERATE 35: CPT

## 2024-01-18 PROCEDURE — 99233 SBSQ HOSP IP/OBS HIGH 50: CPT

## 2024-01-18 RX ADMIN — GABAPENTIN 300 MILLIGRAM(S): 400 CAPSULE ORAL at 05:35

## 2024-01-18 RX ADMIN — GABAPENTIN 300 MILLIGRAM(S): 400 CAPSULE ORAL at 13:12

## 2024-01-18 RX ADMIN — BUPROPION HYDROCHLORIDE 300 MILLIGRAM(S): 150 TABLET, EXTENDED RELEASE ORAL at 11:53

## 2024-01-18 RX ADMIN — GABAPENTIN 300 MILLIGRAM(S): 400 CAPSULE ORAL at 21:30

## 2024-01-18 NOTE — PROGRESS NOTE ADULT - SUBJECTIVE AND OBJECTIVE BOX
Infection due to severe acute respiratory syndrome coronavirus 2 (SARS-CoV-2)    HPI:  29 yo male transitioned to female presents after the patient was found on the ground outside of the house by family, who reports the patient jumped from the second floor window in a suicide attempt followed by self-harm by knife. Unknown PMH/PSH. (13 Jan 2024 13:47)    Interval History:  Patient was seen and examined at bedside around 10 am.  Upset as she cannot go to Inpatient Psych due to COVID Isolation Policy.   Mood is otherwise good.  No suicidal or homicidal ideation.  No active complaints.    ROS:  As per interval history otherwise unremarkable.    PHYSICAL EXAM:  Vital Signs   T(C): 36.9 (18 Jan 2024 11:31), Max: 36.9 (17 Jan 2024 16:35)  T(F): 98.5 (18 Jan 2024 11:31), Max: 98.5 (17 Jan 2024 16:35)  HR: 90 (18 Jan 2024 11:31) (67 - 90)  BP: 127/83 (18 Jan 2024 11:31) (124/75 - 146/87)  RR: 18 (18 Jan 2024 11:31) (18 - 18)  SpO2: 97% (18 Jan 2024 11:31) (97% - 100%)  Parameters below as of 18 Jan 2024 11:31  Patient On (Oxygen Delivery Method): room air  General: Young female sitting in bed comfortably. No acute distress  HEENT: EOMI. Clear conjunctivae. Moist mucus membrane  Neck: Supple.   Chest: Good air entry. No wheezing, rales or rhonchi.   Heart: Normal S1 & S2. RRR.   Abdomen: Non distended. Soft. Non-tender. + BS  Ext: No pedal edema. No calf tenderness   Neuro: Awake and alert. No focal deficit. Speech clear.   Skin: Warm and Dry  Psychiatry: Normal mood and affect    LABS:    01-14    138  |  103  |  10.1  ----------------------------<  83  3.8   |  22.0  |  0.82    Ca    9.0      14 Jan 2024 13:41    Urinalysis Basic - ( 14 Jan 2024 13:41 )    Color: x / Appearance: x / SG: x / pH: x  Gluc: 83 mg/dL / Ketone: x  / Bili: x / Urobili: x   Blood: x / Protein: x / Nitrite: x   Leuk Esterase: x / RBC: x / WBC x   Sq Epi: x / Non Sq Epi: x / Bacteria: x    RADIOLOGY & ADDITIONAL STUDIES:  Reviewed     MEDICATIONS  (STANDING):  buPROPion XL (24-Hour) 300 milliGRAM(s) Oral daily  gabapentin 300 milliGRAM(s) Oral three times a day    MEDICATIONS  (PRN):  acetaminophen     Tablet .. 650 milliGRAM(s) Oral every 6 hours PRN Temp greater or equal to 38C (100.4F), Mild Pain (1 - 3)  aluminum hydroxide/magnesium hydroxide/simethicone Suspension 30 milliLiter(s) Oral every 4 hours PRN Dyspepsia  melatonin 3 milliGRAM(s) Oral at bedtime PRN Insomnia  ondansetron Injectable 4 milliGRAM(s) IV Push every 8 hours PRN Nausea and/or Vomiting

## 2024-01-18 NOTE — BH CONSULTATION LIAISON PROGRESS NOTE - NSBHASSESSMENTFT_PSY_ALL_CORE
Patient is a 28 year old transgender female prefers to be called "Kassy"; domiciled with mother; single; noncaregiver; past psychiatric history of depression; prior psychiatric  hospitalizations; multiple prior suicide attempts; no known history of violence or arrests; recent use of acid and MDMA; PMH of hormone therapy; brought in by EMS; called by family; presenting after jumping from second floor balcony.    1/17: patient seen for follow up and found to be calm, cooperative. patient currently remorseful but still with depressed mood and requesting inpatient psych transfer once covid quarantine is complete.     1/18: Patient seen for f/u. Pat was calm, cooperative , pleasant. Pt was anxious during interview, continues to endorse wanting to be transferred to an inpatient psych facility to work on adjusting her psychiatric treatment regimen as necessary. Denied any acute suicidality, HI/AVH. Reports tolerating meds well and with good appetite. She described her sleep quality as poor, contributing it the the ED environment. Otherwise, remained in good behavioral control overnight.     Recs   -1 to 1 observation  -Continue Wellbutrin 300mg po daily  -Plan for inpatient psych transfer once COVID quarantine is complete unless patient improves. Patient is a 28 year old transgender female prefers to be called "Kassy"; domiciled with mother; single; noncaregiver; past psychiatric history of depression; prior psychiatric  hospitalizations; multiple prior suicide attempts; no known history of violence or arrests; recent use of acid and MDMA; PMH of hormone therapy; brought in by EMS; called by family; presenting after jumping from second floor balcony.    1/17: patient seen for follow up and found to be calm, cooperative. patient currently remorseful but still with depressed mood and requesting inpatient psych transfer once covid quarantine is complete.     1/18: Patient seen for f/u. Pat was calm, cooperative , pleasant. Pt was anxious during interview, continues to request to be transferred to an inpatient psych facility to work on adjusting her psychiatric treatment regimen as necessary. Denied any acute suicidality, HI/AVH. Reports tolerating meds well and with good appetite. She described her sleep quality as poor, contributing it the the ED environment. Otherwise, remained in good behavioral control overnight.     Recs   -1 to 1 observation  -Continue Wellbutrin 300mg po daily  -EKG required for possible inpatient psych transfer   -Plan for inpatient psych transfer once COVID quarantine is complete unless patient improves.

## 2024-01-18 NOTE — BH CONSULTATION LIAISON PROGRESS NOTE - NSBHFUPINTERVALHXFT_PSY_A_CORE
Patient is a 28 year old transgender female prefers to be called "Kassy"; domiciled with mother; single; noncaregiver; past psychiatric history of depression; prior psychiatric  hospitalizations; multiple prior suicide attempts; no known history of violence or arrests; recent use of acid and MDMA; PMH of hormone therapy; brought in by EMS; called by family; presenting after jumping from second floor balcony.    Patient seen for follow up and found to be calm, pleasant and cooperative. Patient shared feeling "good" though was frustrated about having to stay in the ED pending voluntary inpatient psych admission. She denied any SI/HI/AVH and endorsed good appetite. Reports had some difficulty with sleep last night, but attributed this to the noisy patients in the ED.  She continues to endorse needing help on her mental health and advocates for inpatient psych hospitalization.

## 2024-01-18 NOTE — PROGRESS NOTE ADULT - ASSESSMENT
29 yo male transitioned to female presented after the patient was found on the ground outside of the house by family on 01/13/24 -- patient jumped from the second floor window in a suicide attempt followed by self-harm by knife. Patient was seen by trauma service and had necessary imaging which was negative for acute findings. patient was seen by psych and was recommended inpatient psych. COIVD test came back positive and inpatient psych facility is unable to accept her with COVID positive status so she was admitted to hospital. Per patient, she was tested positive 7 days ago. she does not have any COVID related symptoms.    #. COVID 19 infection  Per patient -- tested positive 7 days ago.   Repeat COVID negative x 2 on 1/16 & 1/17  Asymptomatic.  Supportive care    #. Depression with suicide attempt by jumping off the second floor and then stabbing herself with knife  c/w Wellbutrin  Constant Observation   Psych following     #. Gender Change Surgery  Patient takes Medroxyprogesterone Inj every 3 months (got last dose a month ago) and Estradiol 40 mg/ml (0.2 ml IM every 2 weeks) -- she was due for this. Gave a dose on 1/17/24.  Patient needs post op care for her surgery which was done on 3/6/23 by Dr. Olivia Meléndez ([phone]), called their office and discussed with covering provider who recommended continuing care at this time. She uses dilators (made of acrylic) and has at bedside. She is allowed to continue post op care while hospitalized.     #. DVT prophylaxis: Early ambulation    #. Dispo: Inpatient Psych once off isolation precautions.

## 2024-01-19 PROCEDURE — 99232 SBSQ HOSP IP/OBS MODERATE 35: CPT

## 2024-01-19 PROCEDURE — 99233 SBSQ HOSP IP/OBS HIGH 50: CPT

## 2024-01-19 PROCEDURE — 93010 ELECTROCARDIOGRAM REPORT: CPT

## 2024-01-19 RX ORDER — GABAPENTIN 400 MG/1
400 CAPSULE ORAL THREE TIMES A DAY
Refills: 0 | Status: DISCONTINUED | OUTPATIENT
Start: 2024-01-19 | End: 2024-01-23

## 2024-01-19 RX ADMIN — GABAPENTIN 300 MILLIGRAM(S): 400 CAPSULE ORAL at 12:38

## 2024-01-19 RX ADMIN — GABAPENTIN 400 MILLIGRAM(S): 400 CAPSULE ORAL at 21:58

## 2024-01-19 RX ADMIN — BUPROPION HYDROCHLORIDE 300 MILLIGRAM(S): 150 TABLET, EXTENDED RELEASE ORAL at 12:34

## 2024-01-19 RX ADMIN — GABAPENTIN 300 MILLIGRAM(S): 400 CAPSULE ORAL at 05:12

## 2024-01-19 NOTE — BH CONSULTATION LIAISON PROGRESS NOTE - NSBHFUPINTERVALHXFT_PSY_A_CORE
Patient is a 28 year old transgender female prefers to be called "Kassy"; domiciled with mother; single; noncaregiver; past psychiatric history of depression; prior psychiatric  hospitalizations; multiple prior suicide attempts; no known history of violence or arrests; recent use of acid and MDMA; PMH of hormone therapy; brought in by EMS; called by family; presenting after jumping from second floor balcony.     Patient is a 28 year old transgender female prefers to be called "Kassy"; domiciled with mother; single; noncaregiver; past psychiatric history of depression; prior psychiatric  hospitalizations; multiple prior suicide attempts; no known history of violence or arrests; recent use of acid and MDMA; PMH of hormone therapy; brought in by EMS; called by family; presenting after jumping from second floor balcony.    Patient seen and examined at bedside. No acute psychiatric events overnight, remained on 1:1 obs for safety risk. Patient was calm, cooperative and pleasant with a bright affect. Patient shared feeling "wonderful" but is frustrated about the isolation policies for voluntary inpatient psych transfer. Patient shared she feels that she would rather be with her support system at home, which include her parents and being able to speak to her long distance fiancee. She denied any thoughts or plans to harm herself or others. She denied any adverse med effects. Patient attributed her recent behavior from her noncompliance with antidepressant and concurrent substance use. She reports when she takes her meds as prescribed, she feels fine. She advocates to continue psych care outpatient instead,  and forgo voluntary inpatient psych admission.      Patient is a 28 year old transgender female prefers to be called "Kassy"; domiciled with mother; single; noncaregiver; past psychiatric history of depression; prior psychiatric  hospitalizations; multiple prior suicide attempts; no known history of violence or arrests; recent use of acid and MDMA; PMH of hormone therapy; brought in by EMS; called by family; presenting after jumping from second floor balcony.    Patient seen and examined at bedside. No acute psychiatric events overnight, remained on 1:1 obs for safety risk. Patient was calm, cooperative and pleasant. Patient shared feeling "wonderful" but is frustrated about the isolation policies for voluntary inpatient psych transfer. Patient shared she feels that she would rather be with her support system at home, which include her parents and being able to speak to her long distance fiancee. She denied any thoughts or plans to harm herself or others. She denied any adverse med effects. Patient attributed her recent behavior from her noncompliance with antidepressant and concurrent substance use. She reports when she takes her meds as prescribed, she feels fine. Endorsed wanting to increase Gabapentin to help with her anxiety and pain from sexual change operation.

## 2024-01-19 NOTE — BH CONSULTATION LIAISON PROGRESS NOTE - NSBHASSESSMENTFT_PSY_ALL_CORE
Patient is a 28 year old transgender female prefers to be called "Kassy"; domiciled with mother; single; noncaregiver; past psychiatric history of depression; prior psychiatric  hospitalizations; multiple prior suicide attempts; no known history of violence or arrests; recent use of acid and MDMA; PMH of hormone therapy; brought in by EMS; called by family; presenting after jumping from second floor balcony.    1/17: patient seen for follow up and found to be calm, cooperative. patient currently remorseful but still with depressed mood and requesting inpatient psych transfer once covid quarantine is complete.     1/18: Patient seen for f/u. Pat was calm, cooperative , pleasant. Pt was anxious during interview, continues to request to be transferred to an inpatient psych facility to work on adjusting her psychiatric treatment regimen as necessary. Denied any acute suicidality, HI/AVH. Reports tolerating meds well and with good appetite. She described her sleep quality as poor, contributing it the the ED environment. Otherwise, remained in good behavioral control overnight.     1/19:     Recs:   - OBTAIN EKG for possible inpatient psych transfer  -1 to 1 observation  -Continue Wellbutrin 300mg po daily  -Plan for inpatient psych transfer once COVID quarantine is complete unless patient improves. Patient is a 28 year old transgender female prefers to be called "Kassy"; domiciled with mother; single; noncaregiver; past psychiatric history of depression; prior psychiatric  hospitalizations; multiple prior suicide attempts; no known history of violence or arrests; recent use of acid and MDMA; PMH of hormone therapy; brought in by EMS; called by family; presenting after jumping from second floor balcony.    1/17: patient seen for follow up and found to be calm, cooperative. patient currently remorseful but still with depressed mood and requesting inpatient psych transfer once covid quarantine is complete.     1/18: Patient seen for f/u. Pt was calm, cooperative , pleasant. Pt was anxious during interview, continues to request to be transferred to an inpatient psych facility to work on adjusting her psychiatric treatment regimen as necessary. Denied any acute suicidality, HI/AVH. Reports tolerating meds well and with good appetite. She described her sleep quality as poor, contributing it the the ED environment. Otherwise, remained in good behavioral control overnight.     1/19:  Patient seen for f/u. Pt was calm, cooperative and pleasant. Denied any SI/HI/AVH. Reports tolerating medications well. Advocates to continue psychiatric care outpatient instead of inpatient. Remained in good behavioral control overnight.     Recs:   -1 to 1 observation  - Continue Wellbutrin 300mg po daily  - PENDING collateral input for safe discharge. NOT psych cleared at this time.   - Will need a safety plan prior to discharge  - Will need referral to outpatient psychiatry prior to discharge  Patient is a 28 year old transgender female prefers to be called "Kassy"; domiciled with mother; single; noncaregiver; past psychiatric history of depression; prior psychiatric  hospitalizations; multiple prior suicide attempts; no known history of violence or arrests; recent use of acid and MDMA; PMH of hormone therapy; brought in by EMS; called by family; presenting after jumping from second floor balcony.    1/17: patient seen for follow up and found to be calm, cooperative. patient currently remorseful but still with depressed mood and requesting inpatient psych transfer once covid quarantine is complete.     1/18: Patient seen for f/u. Pt was calm, cooperative , pleasant. Pt was anxious during interview, continues to request to be transferred to an inpatient psych facility to work on adjusting her psychiatric treatment regimen as necessary. Denied any acute suicidality, HI/AVH. Reports tolerating meds well and with good appetite. She described her sleep quality as poor, contributing it the the ED environment. Otherwise, remained in good behavioral control overnight.     1/19:  Patient seen for f/u. Pt was calm, cooperative and pleasant. Denied any SI/HI/AVH. Reports tolerating medications well. Open to increasing standing Gabapentin for pain and anxiety. Remained in good behavioral control overnight.   Dr Silva spoke with patient's mother, who was present at bedside. Mother shared concerns for patient to go home at this time and would rather have patient stay in an inpatient unit for a few days to continue close monitoring and adjustments of medications as necessary. Discussed with patient, who is agreeable. Will continue to benefit from and will continue to pursue voluntary psych admission.     Recs:   - Obtain EKG and COVID swab for transfer to inpatient psych unit  -1 to 1 observation  - INCREASE Wellbutrin 400 mg TID  - PENDING voluntary inpatient psych admission  Patient is a 28 year old transgender female prefers to be called "Kassy"; domiciled with mother; single; noncaregiver; past psychiatric history of depression; prior psychiatric  hospitalizations; multiple prior suicide attempts; no known history of violence or arrests; recent use of acid and MDMA; PMH of hormone therapy; brought in by EMS; called by family; presenting after jumping from second floor balcony.    1/17: patient seen for follow up and found to be calm, cooperative. patient currently remorseful but still with depressed mood and requesting inpatient psych transfer once covid quarantine is complete.     1/18: Patient seen for f/u. Pt was calm, cooperative , pleasant. Pt was anxious during interview, continues to request to be transferred to an inpatient psych facility to work on adjusting her psychiatric treatment regimen as necessary. Denied any acute suicidality, HI/AVH. Reports tolerating meds well and with good appetite. She described her sleep quality as poor, contributing it the the ED environment. Otherwise, remained in good behavioral control overnight.     1/19:  Patient seen for f/u. Pt was calm, cooperative and pleasant. Denied any SI/HI/AVH. Reports tolerating medications well. Open to increasing standing Gabapentin for pain and anxiety. Remained in good behavioral control overnight.   Dr Silva spoke with patient's mother, who was present at bedside. Mother shared concerns for patient to go home at this time and would rather have patient stay in an inpatient unit for a few days to continue close monitoring and adjustments of medications as necessary. Discussed with patient, who is agreeable. Will continue to benefit from and will continue to pursue voluntary psych admission.     Recs:   - Obtain EKG and COVID swab for transfer to inpatient psych unit  -1 to 1 observation  - INCREASE Gabapentin 400 mg TID  - PENDING voluntary inpatient psych admission  Patient is a 28 year old transgender female prefers to be called "Kassy"; domiciled with mother; single; noncaregiver; past psychiatric history of depression; prior psychiatric  hospitalizations; multiple prior suicide attempts; no known history of violence or arrests; recent use of acid and MDMA; PMH of hormone therapy; brought in by EMS; called by family; presenting after jumping from second floor balcony.    1/17: patient seen for follow up and found to be calm, cooperative. patient currently remorseful but still with depressed mood and requesting inpatient psych transfer once covid quarantine is complete.     1/18: Patient seen for f/u. Pt was calm, cooperative , pleasant. Pt was anxious during interview, continues to request to be transferred to an inpatient psych facility to work on adjusting her psychiatric treatment regimen as necessary. Denied any acute suicidality, HI/AVH. Reports tolerating meds well and with good appetite. She described her sleep quality as poor, contributing it the the ED environment. Otherwise, remained in good behavioral control overnight.     1/19:  Patient seen for f/u. Pt was calm, cooperative and pleasant. Denied any SI/HI/AVH. Reports tolerating medications well. Open to increasing standing Gabapentin for pain and anxiety. Remained in good behavioral control overnight.   Dr Silva spoke with patient's mother, who was present at bedside. Mother shared concerns for patient to go home at this time and would rather have patient stay in an inpatient unit for a few days to continue close monitoring and adjustments of medications as necessary. Discussed with patient, who is agreeable. Will continue to benefit from and will continue to pursue voluntary psych admission.     Recs:   - Obtain EKG and COVID swab for transfer to inpatient psych unit  -1 to 1 observation (patient given permission to use laptop and cellphone while on 1 to1 )   - INCREASE Gabapentin 400 mg TID  - PENDING voluntary inpatient psych admission

## 2024-01-19 NOTE — PROGRESS NOTE ADULT - ASSESSMENT
29 yo male transitioned to female presented after the patient was found on the ground outside of the house by family on 01/13/24 -- patient jumped from the second floor window in a suicide attempt followed by self-harm by knife. Patient was seen by trauma service and had necessary imaging which was negative for acute findings. patient was seen by psych and was recommended inpatient psych. COIVD test came back positive and inpatient psych facility is unable to accept her with COVID positive status so she was admitted to hospital. Per patient, she was tested positive 7 days ago. she does not have any COVID related symptoms.    #. COVID 19 infection  Per patient -- tested positive 7 days ago.   Repeat COVID negative x 2 on 1/16 & 1/17  Asymptomatic.  Supportive care    #. Depression with suicide attempt by jumping off the second floor and then stabbing herself with knife  c/w Wellbutrin  Increase Gabapentin to 400 mg TID  Constant Observation   Psych following     #. Gender Change Surgery  Patient takes Medroxyprogesterone Inj every 3 months (got last dose a month ago) and Estradiol 40 mg/ml (0.2 ml IM every 2 weeks) -- she was due for this. Gave a dose on 1/17/24.  Patient needs post op care for her surgery which was done on 3/6/23 by Dr. Olivia Meléndez ([phone]), called their office and discussed with covering provider who recommended continuing care at this time. She uses dilators (made of acrylic) and has at bedside. She is allowed to continue post op care while hospitalized.     #. DVT prophylaxis: Early ambulation    #. Dispo: Inpatient Psych once off isolation precautions.

## 2024-01-19 NOTE — PROGRESS NOTE ADULT - SUBJECTIVE AND OBJECTIVE BOX
Infection due to severe acute respiratory syndrome coronavirus 2 (SARS-CoV-2)    HPI:  27 yo male transitioned to female presents after the patient was found on the ground outside of the house by family, who reports the patient jumped from the second floor window in a suicide attempt followed by self-harm by knife. Unknown PMH/PSH. (13 Jan 2024 13:47)    Interval History:  Patient was seen and examined at bedside around 9:30 am.  Feels OK.   No suicidal or homicidal ideation.  No active complaints.    ROS:  As per interval history otherwise unremarkable.    PHYSICAL EXAM:  Vital Signs   T(C): 36.8 (19 Jan 2024 15:33), Max: 37.5 (19 Jan 2024 04:30)  T(F): 98.3 (19 Jan 2024 15:33), Max: 99.5 (19 Jan 2024 04:30)  HR: 67 (19 Jan 2024 15:33) (62 - 97)  BP: 146/85 (19 Jan 2024 15:33) (128/79 - 146/85)  RR: 17 (19 Jan 2024 15:33) (17 - 20)  SpO2: 100% (19 Jan 2024 15:33) (97% - 100%)  Parameters below as of 19 Jan 2024 15:33  Patient On (Oxygen Delivery Method): room air  General: Young female sitting in bed comfortably. No acute distress  HEENT: EOMI. Clear conjunctivae. Moist mucus membrane  Neck: Supple.   Chest: Good air entry. No wheezing, rales or rhonchi.   Heart: Normal S1 & S2. RRR.   Abdomen: Non distended. Soft. Non-tender. + BS  Ext: No pedal edema. No calf tenderness   Neuro: Awake and alert. No focal deficit. Speech clear.   Skin: Warm and Dry  Psychiatry: Normal mood and affect    LABS:    01-14    138  |  103  |  10.1  ----------------------------<  83  3.8   |  22.0  |  0.82    Ca    9.0      14 Jan 2024 13:41    Urinalysis Basic - ( 14 Jan 2024 13:41 )    Color: x / Appearance: x / SG: x / pH: x  Gluc: 83 mg/dL / Ketone: x  / Bili: x / Urobili: x   Blood: x / Protein: x / Nitrite: x   Leuk Esterase: x / RBC: x / WBC x   Sq Epi: x / Non Sq Epi: x / Bacteria: x    RADIOLOGY & ADDITIONAL STUDIES:  Reviewed     MEDICATIONS  (STANDING):  buPROPion XL (24-Hour) 300 milliGRAM(s) Oral daily  gabapentin 400 milliGRAM(s) Oral three times a day    MEDICATIONS  (PRN):  acetaminophen     Tablet .. 650 milliGRAM(s) Oral every 6 hours PRN Temp greater or equal to 38C (100.4F), Mild Pain (1 - 3)  aluminum hydroxide/magnesium hydroxide/simethicone Suspension 30 milliLiter(s) Oral every 4 hours PRN Dyspepsia  melatonin 3 milliGRAM(s) Oral at bedtime PRN Insomnia  ondansetron Injectable 4 milliGRAM(s) IV Push every 8 hours PRN Nausea and/or Vomiting

## 2024-01-20 LAB — SARS-COV-2 RNA SPEC QL NAA+PROBE: SIGNIFICANT CHANGE UP

## 2024-01-20 PROCEDURE — 99233 SBSQ HOSP IP/OBS HIGH 50: CPT

## 2024-01-20 RX ADMIN — GABAPENTIN 400 MILLIGRAM(S): 400 CAPSULE ORAL at 05:33

## 2024-01-20 RX ADMIN — BUPROPION HYDROCHLORIDE 300 MILLIGRAM(S): 150 TABLET, EXTENDED RELEASE ORAL at 11:16

## 2024-01-20 RX ADMIN — Medication 3 MILLIGRAM(S): at 22:54

## 2024-01-20 RX ADMIN — GABAPENTIN 400 MILLIGRAM(S): 400 CAPSULE ORAL at 21:41

## 2024-01-20 RX ADMIN — GABAPENTIN 400 MILLIGRAM(S): 400 CAPSULE ORAL at 14:35

## 2024-01-20 NOTE — BH CONSULTATION LIAISON PROGRESS NOTE - LEVEL OF CONSCIOUSNESS
Alert Mercedes Flap Text: The defect edges were debeveled with a #15 scalpel blade.  Given the location of the defect, shape of the defect and the proximity to free margins a Mercedes flap was deemed most appropriate.  Using a sterile surgical marker, an appropriate advancement flap was drawn incorporating the defect and placing the expected incisions within the relaxed skin tension lines where possible. The area thus outlined was incised deep to adipose tissue with a #15 scalpel blade.  The skin margins were undermined to an appropriate distance in all directions utilizing iris scissors.

## 2024-01-20 NOTE — PROGRESS NOTE ADULT - SUBJECTIVE AND OBJECTIVE BOX
CHIEF COMPLAINT/INTERVAL HISTORY:    Patient is a 28y old  Male who presents with a chief complaint of Trauma B (19 Jan 2024 16:51)    SUBJECTIVE & OBJECTIVE: Pt seen and examined at bedside. No overnight events. Reports feeling better. Awaiting placement.    ROS: No chest pain, palpitations, SOB, light headedness, dizziness, headache, nausea/vomiting, fevers/chills, abdominal pain, dysuria or increased urinary frequency.    ICU Vital Signs Last 24 Hrs  T(C): 36.7 (20 Jan 2024 11:38), Max: 36.8 (19 Jan 2024 18:15)  T(F): 98 (20 Jan 2024 11:38), Max: 98.2 (19 Jan 2024 18:15)  HR: 77 (20 Jan 2024 11:38) (73 - 85)  BP: 125/88 (20 Jan 2024 11:38) (119/65 - 125/88)  RR: 18 (20 Jan 2024 05:24) (18 - 18)  SpO2: 99% (20 Jan 2024 11:38) (99% - 99%)    O2 Parameters below as of 20 Jan 2024 05:24  Patient On (Oxygen Delivery Method): room air    MEDICATIONS  (STANDING):  buPROPion XL (24-Hour) 300 milliGRAM(s) Oral daily  gabapentin 400 milliGRAM(s) Oral three times a day    MEDICATIONS  (PRN):  acetaminophen     Tablet .. 650 milliGRAM(s) Oral every 6 hours PRN Temp greater or equal to 38C (100.4F), Mild Pain (1 - 3)  aluminum hydroxide/magnesium hydroxide/simethicone Suspension 30 milliLiter(s) Oral every 4 hours PRN Dyspepsia  melatonin 3 milliGRAM(s) Oral at bedtime PRN Insomnia  ondansetron Injectable 4 milliGRAM(s) IV Push every 8 hours PRN Nausea and/or Vomiting    PHYSICAL EXAM:    GENERAL: young transgender female, laying in bed, NAd  HEAD:  Atraumatic, Normocephalic  EYES: EOMI, PERRLA, conjunctiva and sclera clear  ENMT: Moist mucous membranes  NECK: Supple   NERVOUS SYSTEM:  Alert & Oriented X3, Motor Strength 5/5 B/L upper and lower extremities   CHEST/LUNG: Clear to auscultation bilaterally   HEART: Regular rate and rhythm; + S1/S2  ABDOMEN: Soft, Nontender, Nondistended; Bowel sounds present  EXTREMITIES:  no pedal edema

## 2024-01-20 NOTE — BH CONSULTATION LIAISON PROGRESS NOTE - NSBHCONSULTFOLLOW_PSY_ALL_CORE
37f w a hx of HLD, anxiety, GERD, Grave's, EtOH abuse, active smoker & benzodiazepine abuse, initially presented to ED requesting detox. Upon further questioning,     patient admitted to having nausea, vominiting and loose BM since easter. Upon admission, patient was found to be tachycardic, dehydrated and with lactic Acidosis. All resolved    with IVF, patient now tolerating regular diet, HR 70s and generally feels improved. During her hospital stay, she was noted to have expiratory wheezing on exam and was started     on a short course of Prednisone. Patient was evaluated for in patient detox but refused. She finished Ativan protocol this morning and is now medically stable for discharge. This     was communicated to the patient and she is in agreement Yes...

## 2024-01-20 NOTE — BH CONSULTATION LIAISON PROGRESS NOTE - NSBHFUPINTERVALHXFT_PSY_A_CORE
Patient is a 28 year old transgender female prefers to be called "Kassy"; domiciled with mother; single; noncaregiver; past psychiatric history of depression; prior psychiatric  hospitalizations; multiple prior suicide attempts; no known history of violence or arrests; recent use of acid and MDMA; PMH of hormone therapy; brought in by EMS; called by family; presenting after jumping from second floor balcon.    Patient seen and examined at bedside. No acute psychiatric events overnight, remained on 1:1 obs for safety risk. Patient was calm, cooperative and pleasant. Patient states she is doing well, no reported difficulty with medication. still with depressed mood but with no reported s/h ideation or AVH

## 2024-01-20 NOTE — PROGRESS NOTE ADULT - ASSESSMENT
29 yo male transitioned to female presented after the patient was found on the ground outside of the house by family on 01/13/24 -- patient jumped from the second floor window in a suicide attempt followed by self-harm by knife. Patient was seen by trauma service and had necessary imaging which was negative for acute findings. patient was seen by psych and was recommended inpatient psych. COIVD test came back positive and inpatient psych facility is unable to accept her with COVID positive status so she was admitted to hospital. Per patient, she was tested positive 7 days ago. she does not have any COVID related symptoms.    # COVID 19 infection  Asymptomatic  Per patient -- tested positive 7 days ago.   Repeat COVID testing has been negative; requires additional negative PCR on 1/21 to be cleared for inpatient psych  Supportive care    # Depression with suicide attempt  c/w Wellbutrin and Gabapentin    Constant Observation   Psych recs appreciated    # s/p Gender Change Surgery in March 2023  Patient takes Medroxyprogesterone Inj every 3 months (got last dose a month ago) and Estradiol 40 mg/ml (0.2 ml IM every 2 weeks) which was given on 1/17/24.  Patient needs post op care for her surgery which was done on 3/6/23 by Dr. Olivia Meléndez ([phone]), called their office and discussed with covering provider who recommended continuing care at this time. She uses dilators (made of acrylic) and has at bedside. She is allowed to continue post op care while hospitalized.     DVT prophylaxis: Early ambulation    Dispo- Medically stable for inpatient psych if repeat COVID PCR on 1/21 is negative. Probable discharge to [facility] on 1/22.    Plan discussed with patient, Dr. Silva, RN

## 2024-01-20 NOTE — BH CONSULTATION LIAISON PROGRESS NOTE - NSBHASSESSMENTFT_PSY_ALL_CORE
Patient is a 28 year old transgender female prefers to be called "Kassy"; domiciled with mother; single; noncaregiver; past psychiatric history of depression; prior psychiatric  hospitalizations; multiple prior suicide attempts; no known history of violence or arrests; recent use of acid and MDMA; PMH of hormone therapy; brought in by EMS; called by family; presenting after jumping from second floor balcony.    1/17: patient seen for follow up and found to be calm, cooperative. patient currently remorseful but still with depressed mood and requesting inpatient psych transfer once covid quarantine is complete.     1/18: Patient seen for f/u. Pt was calm, cooperative , pleasant. Pt was anxious during interview, continues to request to be transferred to an inpatient psych facility to work on adjusting her psychiatric treatment regimen as necessary. Denied any acute suicidality, HI/AVH. Reports tolerating meds well and with good appetite. She described her sleep quality as poor, contributing it the the ED environment. Otherwise, remained in good behavioral control overnight.     1/19:  Patient seen for f/u. Pt was calm, cooperative and pleasant. Denied any SI/HI/AVH. Reports tolerating medications well. Open to increasing standing Gabapentin for pain and anxiety. Remained in good behavioral control overnight.   Dr Silva spoke with patient's mother, who was present at bedside. Mother shared concerns for patient to go home at this time and would rather have patient stay in an inpatient unit for a few days to continue close monitoring and adjustments of medications as necessary. Discussed with patient, who is agreeable. Will continue to benefit from and will continue to pursue voluntary psych admission.     1/20: patient seen for follow up and found tob e calm and cooperative. patient s/p suicide attempt , still with depresssed mood and requesting vol inpatient admission     Recs:   - EKG and COVID swab done   -1 to 1 observation (patient given permission to use laptop and cellphone while on 1 to1 )   - Continue Gabapentin 400 mg TID  - PENDING voluntary inpatient psych admission

## 2024-01-21 LAB — SARS-COV-2 RNA SPEC QL NAA+PROBE: SIGNIFICANT CHANGE UP

## 2024-01-21 PROCEDURE — 99232 SBSQ HOSP IP/OBS MODERATE 35: CPT

## 2024-01-21 RX ADMIN — GABAPENTIN 400 MILLIGRAM(S): 400 CAPSULE ORAL at 13:14

## 2024-01-21 RX ADMIN — GABAPENTIN 400 MILLIGRAM(S): 400 CAPSULE ORAL at 05:43

## 2024-01-21 RX ADMIN — GABAPENTIN 400 MILLIGRAM(S): 400 CAPSULE ORAL at 21:50

## 2024-01-21 RX ADMIN — BUPROPION HYDROCHLORIDE 300 MILLIGRAM(S): 150 TABLET, EXTENDED RELEASE ORAL at 13:14

## 2024-01-21 NOTE — PROGRESS NOTE ADULT - ASSESSMENT
27 yo male transitioned to female presented after the patient was found on the ground outside of the house by family on 01/13/24 -- patient jumped from the second floor window in a suicide attempt followed by self-harm by knife. Patient was seen by trauma service and had necessary imaging which was negative for acute findings. patient was seen by psych and was recommended inpatient psych. COIVD test came back positive and inpatient psych facility is unable to accept her with COVID positive status so she was admitted to hospital. Per patient, she was tested positive 7 days ago. she does not have any COVID related symptoms.    # COVID 19 infection  Asymptomatic  Per patient -- tested positive 7 days ago.   Repeat COVID testing has been negative; requires additional negative PCR on 1/21 to be cleared for inpatient psych  Supportive care    # Depression with suicide attempt  c/w Wellbutrin and Gabapentin    Constant Observation   Psych recs appreciated    # s/p Gender Change Surgery in March 2023  Patient takes Medroxyprogesterone Inj every 3 months (got last dose a month ago) and Estradiol 40 mg/ml (0.2 ml IM every 2 weeks) which was given on 1/17/24.  Patient needs post op care for her surgery which was done on 3/6/23 by Dr. Olivia Meléndez ([phone]), called their office and discussed with covering provider who recommended continuing care at this time. She uses dilators (made of acrylic) and has at bedside. She is allowed to continue post op care while hospitalized.     DVT prophylaxis: Early ambulation    Dispo- Medically stable for inpatient psych; probable discharge to [facility] on 1/22.    Plan discussed with patient, RN

## 2024-01-21 NOTE — PROGRESS NOTE ADULT - SUBJECTIVE AND OBJECTIVE BOX
CHIEF COMPLAINT/INTERVAL HISTORY:    Patient is a 28y old  Male who presents with a chief complaint of Trauma B (20 Jan 2024 15:43)    SUBJECTIVE & OBJECTIVE: Pt seen and examined at bedside. No overnight events. No new complaints.    ROS: No chest pain, palpitations, SOB, light headedness, dizziness, headache, nausea/vomiting, fevers/chills, abdominal pain, dysuria.    ICU Vital Signs Last 24 Hrs  T(C): 36.8 (21 Jan 2024 05:09), Max: 37 (20 Jan 2024 17:52)  T(F): 98.2 (21 Jan 2024 05:09), Max: 98.6 (20 Jan 2024 17:52)  HR: 73 (21 Jan 2024 05:09) (73 - 79)  BP: 122/63 (21 Jan 2024 05:09) (122/63 - 133/79)  RR: 18 (21 Jan 2024 05:09) (18 - 18)  SpO2: 99% (21 Jan 2024 05:09) (98% - 99%)    O2 Parameters below as of 21 Jan 2024 05:09  Patient On (Oxygen Delivery Method): room air    MEDICATIONS  (STANDING):  buPROPion XL (24-Hour) 300 milliGRAM(s) Oral daily  gabapentin 400 milliGRAM(s) Oral three times a day    MEDICATIONS  (PRN):  acetaminophen     Tablet .. 650 milliGRAM(s) Oral every 6 hours PRN Temp greater or equal to 38C (100.4F), Mild Pain (1 - 3)  aluminum hydroxide/magnesium hydroxide/simethicone Suspension 30 milliLiter(s) Oral every 4 hours PRN Dyspepsia  melatonin 3 milliGRAM(s) Oral at bedtime PRN Insomnia  ondansetron Injectable 4 milliGRAM(s) IV Push every 8 hours PRN Nausea and/or Vomiting    PHYSICAL EXAM:    GENERAL: young transgender female, laying in bed, NAd  HEAD:  Atraumatic, Normocephalic  EYES: EOMI, PERRLA, conjunctiva and sclera clear  ENMT: Moist mucous membranes  NECK: Supple   NERVOUS SYSTEM:  Alert & Oriented X3, Motor Strength 5/5 B/L upper and lower extremities   CHEST/LUNG: Clear to auscultation bilaterally   HEART: Regular rate and rhythm; + S1/S2  ABDOMEN: Soft, Nontender, Nondistended; Bowel sounds present  EXTREMITIES:  no pedal edema

## 2024-01-22 PROCEDURE — 99232 SBSQ HOSP IP/OBS MODERATE 35: CPT

## 2024-01-22 RX ADMIN — BUPROPION HYDROCHLORIDE 300 MILLIGRAM(S): 150 TABLET, EXTENDED RELEASE ORAL at 11:36

## 2024-01-22 RX ADMIN — GABAPENTIN 400 MILLIGRAM(S): 400 CAPSULE ORAL at 14:04

## 2024-01-22 RX ADMIN — GABAPENTIN 400 MILLIGRAM(S): 400 CAPSULE ORAL at 05:43

## 2024-01-22 RX ADMIN — GABAPENTIN 400 MILLIGRAM(S): 400 CAPSULE ORAL at 21:25

## 2024-01-22 NOTE — BH CONSULTATION LIAISON PROGRESS NOTE - NSBHASSESSMENTFT_PSY_ALL_CORE
Patient is a 28 year old transgender female prefers to be called "Kassy"; domiciled with mother; single; noncaregiver; past psychiatric history of depression; prior psychiatric  hospitalizations; multiple prior suicide attempts; no known history of violence or arrests; recent use of acid and MDMA; PMH of hormone therapy; brought in by EMS; called by family; presenting after jumping from second floor balcony.    1/19:  Patient seen for f/u. Pt was calm, cooperative and pleasant. Denied any SI/HI/AVH. Reports tolerating medications well. Open to increasing standing Gabapentin for pain and anxiety. Remained in good behavioral control overnight.   Dr Silva spoke with patient's mother, who was present at bedside. Mother shared concerns for patient to go home at this time and would rather have patient stay in an inpatient unit for a few days to continue close monitoring and adjustments of medications as necessary. Discussed with patient, who is agreeable. Will continue to benefit from and will continue to pursue voluntary psych admission.     1/20: patient seen for follow up and found to be calm and cooperative. patient s/p suicide attempt , still with depresssed mood and requesting vol inpatient admission     1/22: Pt seen and evaluated for f/u. Patient is s/p suicide attempt. Endorsed depressed and anxious mood, with intermittent thoughts of passive suicidality. Pt was with constricted affect and appeared withdrawn at times during interview. Continues to request for inpatient psych voluntary admission.     Recs:   - EKG and COVID swab done   -1 to 1 observation (patient given permission to use laptop and cellphone while on 1 to1 )   - Continue Gabapentin 400 mg TID  - PENDING voluntary inpatient psych admission Patient is a 28 year old transgender female prefers to be called "Kassy"; domiciled with mother; single; noncaregiver; past psychiatric history of depression; prior psychiatric  hospitalizations; multiple prior suicide attempts; no known history of violence or arrests; recent use of acid and MDMA; PMH of hormone therapy; brought in by EMS; called by family; presenting after jumping from second floor balcony.    1/19:  Patient seen for f/u. Pt was calm, cooperative and pleasant. Denied any SI/HI/AVH. Reports tolerating medications well. Open to increasing standing Gabapentin for pain and anxiety. Remained in good behavioral control overnight.   Dr Silva spoke with patient's mother, who was present at bedside. Mother shared concerns for patient to go home at this time and would rather have patient stay in an inpatient unit for a few days to continue close monitoring and adjustments of medications as necessary. Discussed with patient, who is agreeable. Will continue to benefit from and will continue to pursue voluntary psych admission.     1/20: patient seen for follow up and found to be calm and cooperative. patient s/p suicide attempt , still with depresssed mood and requesting vol inpatient admission     1/22: Pt seen and evaluated for f/u. Patient is s/p suicide attempt. Endorsed depressed and anxious mood, with intermittent thoughts of passive suicidality. Pt was with constricted affect and appeared withdrawn at times during interview. Continues to request for inpatient psych voluntary admission.     Recs:   - EKG and COVID swab done   -1 to 1 observation (patient given permission to use laptop and cellphone while on 1 to1 )   - Continue Gabapentin 400 mg TID  - Continue Wellbutrin   - PENDING voluntary inpatient psych admission

## 2024-01-22 NOTE — BH CONSULTATION LIAISON PROGRESS NOTE - NSBHFUPINTERVALHXFT_PSY_A_CORE
Patient is a 28 year old transgender female prefers to be called "Kassy"; domiciled with mother; single; noncaregiver; past psychiatric history of depression; prior psychiatric  hospitalizations; multiple prior suicide attempts; no known history of violence or arrests; recent use of acid and MDMA; PMH of hormone therapy; brought in by EMS; called by family; presenting after jumping from second floor balcony.    Pt seen and examined at bedside. Patient was with anxious and depressed mood and constricted affect. Patient endorsed feeling depressed, w/ intermittent passive SI. Denied active plan or thoughts to hurt herself during interview. Endorsed good sleep and okay appetite. Denied any adverse med effects.

## 2024-01-22 NOTE — DIETITIAN INITIAL EVALUATION ADULT - OTHER INFO
Per Chart. 29 yo male transitioned to female presented after the patient was found on the ground outside of the house by family on 01/13/24 -- patient jumped from the second floor window in a suicide attempt followed by self-harm by knife. Patient was seen by trauma service and had necessary imaging which was negative for acute findings. patient was seen by psych and was recommended inpatient psych. COIVD test came back positive and inpatient psych facility is unable to accept her with COVID positive status so she was admitted to hospital. Per patient, she was tested positive 7 days ago. she does not have any COVID related symptoms.

## 2024-01-22 NOTE — DIETITIAN INITIAL EVALUATION ADULT - PERTINENT MEDS FT
MEDICATIONS  (STANDING):  buPROPion XL (24-Hour) 300 milliGRAM(s) Oral daily  gabapentin 400 milliGRAM(s) Oral three times a day    MEDICATIONS  (PRN):  acetaminophen     Tablet .. 650 milliGRAM(s) Oral every 6 hours PRN Temp greater or equal to 38C (100.4F), Mild Pain (1 - 3)  aluminum hydroxide/magnesium hydroxide/simethicone Suspension 30 milliLiter(s) Oral every 4 hours PRN Dyspepsia  melatonin 3 milliGRAM(s) Oral at bedtime PRN Insomnia  ondansetron Injectable 4 milliGRAM(s) IV Push every 8 hours PRN Nausea and/or Vomiting

## 2024-01-22 NOTE — PROGRESS NOTE ADULT - SUBJECTIVE AND OBJECTIVE BOX
CHIEF COMPLAINT/INTERVAL HISTORY:    Patient is a 28y old  Male who presents with a chief complaint of Infection due to severe acute respiratory syndrome coronavirus 2 (SARS-CoV-2)     (22 Jan 2024 10:52)    SUBJECTIVE & OBJECTIVE: Pt seen and examined at bedside. No overnight events. No new complaints; eager to be discharged. Awaiting inpatient psych bed.    ROS: No chest pain, palpitations, SOB, light headedness, dizziness, headache, nausea/vomiting, fevers/chills, abdominal pain, dysuria or increased urinary frequency.    ICU Vital Signs Last 24 Hrs  T(C): 36.7 (22 Jan 2024 11:14), Max: 37.1 (21 Jan 2024 17:13)  T(F): 98.1 (22 Jan 2024 11:14), Max: 98.7 (21 Jan 2024 17:13)  HR: 81 (22 Jan 2024 11:14) (70 - 81)  BP: 127/67 (22 Jan 2024 11:14) (101/48 - 135/69)  RR: 18 (22 Jan 2024 11:14) (16 - 18)  SpO2: 97% (22 Jan 2024 11:14) (97% - 99%)    O2 Parameters below as of 22 Jan 2024 11:14  Patient On (Oxygen Delivery Method): room air      MEDICATIONS  (STANDING):  buPROPion XL (24-Hour) 300 milliGRAM(s) Oral daily  gabapentin 400 milliGRAM(s) Oral three times a day    MEDICATIONS  (PRN):  acetaminophen     Tablet .. 650 milliGRAM(s) Oral every 6 hours PRN Temp greater or equal to 38C (100.4F), Mild Pain (1 - 3)  aluminum hydroxide/magnesium hydroxide/simethicone Suspension 30 milliLiter(s) Oral every 4 hours PRN Dyspepsia  melatonin 3 milliGRAM(s) Oral at bedtime PRN Insomnia  ondansetron Injectable 4 milliGRAM(s) IV Push every 8 hours PRN Nausea and/or Vomiting    PHYSICAL EXAM:    GENERAL: young transgender female, laying in bed, NAd  HEAD:  Atraumatic, Normocephalic  EYES: EOMI, PERRLA, conjunctiva and sclera clear  ENMT: Moist mucous membranes  NECK: Supple   NERVOUS SYSTEM:  Alert & Oriented X3, Motor Strength 5/5 B/L upper and lower extremities   CHEST/LUNG: Clear to auscultation bilaterally   HEART: Regular rate and rhythm; + S1/S2  ABDOMEN: Soft, Nontender, Nondistended; Bowel sounds present  EXTREMITIES:  no pedal edema

## 2024-01-22 NOTE — PROGRESS NOTE ADULT - PROVIDER SPECIALTY LIST ADULT
Hospitalist
Trauma Surgery

## 2024-01-22 NOTE — PROGRESS NOTE ADULT - ASSESSMENT
29 yo transgender female presented after the patient was found on the ground outside of the house by family on 01/13/24 -- patient jumped from the second floor window in a suicide attempt followed by self-harm by knife. Patient was seen by trauma service and had necessary imaging which was negative for acute findings. patient was seen by psych and was recommended inpatient psych. COIVD test came back positive and inpatient psych facility is unable to accept her with COVID positive status so she was admitted to hospital. Per patient, she was tested positive 7 days ago. she does not have any COVID related symptoms.    # COVID 19 infection  Asymptomatic  Repeat COVID testing has been negative   Med stable for discharge to inpatient psych    # Depression with suicide attempt  c/w Wellbutrin and Gabapentin    Constant Observation   Psych recs appreciated    # s/p Gender Change Surgery in March 2023  Patient takes Medroxyprogesterone Inj every 3 months (got last dose a month ago) and Estradiol 40 mg/ml (0.2 ml IM every 2 weeks) which was given on 1/17/24.  Patient needs post op care for her surgery which was done on 3/6/23 by Dr. Olivia Meléndez ([phone]), called their office and discussed with covering provider who recommended continuing care at this time. She uses dilators (made of acrylic) and has at bedside. She is allowed to continue post op care while hospitalized.     DVT prophylaxis: Early ambulation    Dispo- Medically stable for inpatient psych; awaiting bed availability.     Plan discussed with patient, RN, SW

## 2024-01-22 NOTE — DIETITIAN INITIAL EVALUATION ADULT - ORAL INTAKE PTA/DIET HISTORY
Pt met at bedside with RN present, Pt reports Ht of 5'9 and weight of 140lbs. Pt reports PO % depending on taste, also found with snacks at bedside. Pt W/o any nausea vomiting or diarrhea with food allergy of mangos . Pt only questions were on food preferences, RD remains available . Pt met at bedside with RN present, Pt reports Ht of 5'9 and weight of 140lbs which remains stable. Pt reports PO % depending on taste, also found with snacks at bedside. Pt W/o any nausea vomiting or diarrhea with food allergy of mangos . Pt only questions were on food preferences, RD remains available .

## 2024-01-23 ENCOUNTER — TRANSCRIPTION ENCOUNTER (OUTPATIENT)
Age: 29
End: 2024-01-23

## 2024-01-23 VITALS
SYSTOLIC BLOOD PRESSURE: 131 MMHG | HEART RATE: 86 BPM | RESPIRATION RATE: 18 BRPM | DIASTOLIC BLOOD PRESSURE: 77 MMHG | OXYGEN SATURATION: 96 % | TEMPERATURE: 98 F

## 2024-01-23 PROCEDURE — 80053 COMPREHEN METABOLIC PANEL: CPT

## 2024-01-23 PROCEDURE — 80307 DRUG TEST PRSMV CHEM ANLYZR: CPT

## 2024-01-23 PROCEDURE — 85730 THROMBOPLASTIN TIME PARTIAL: CPT

## 2024-01-23 PROCEDURE — 86901 BLOOD TYPING SEROLOGIC RH(D): CPT

## 2024-01-23 PROCEDURE — 84702 CHORIONIC GONADOTROPIN TEST: CPT

## 2024-01-23 PROCEDURE — 83690 ASSAY OF LIPASE: CPT

## 2024-01-23 PROCEDURE — 93005 ELECTROCARDIOGRAM TRACING: CPT

## 2024-01-23 PROCEDURE — 86850 RBC ANTIBODY SCREEN: CPT

## 2024-01-23 PROCEDURE — 86900 BLOOD TYPING SEROLOGIC ABO: CPT

## 2024-01-23 PROCEDURE — 72125 CT NECK SPINE W/O DYE: CPT | Mod: MA

## 2024-01-23 PROCEDURE — 99239 HOSP IP/OBS DSCHRG MGMT >30: CPT

## 2024-01-23 PROCEDURE — 70496 CT ANGIOGRAPHY HEAD: CPT | Mod: MA

## 2024-01-23 PROCEDURE — 85025 COMPLETE CBC W/AUTO DIFF WBC: CPT

## 2024-01-23 PROCEDURE — 87635 SARS-COV-2 COVID-19 AMP PRB: CPT

## 2024-01-23 PROCEDURE — 99233 SBSQ HOSP IP/OBS HIGH 50: CPT

## 2024-01-23 PROCEDURE — 81001 URINALYSIS AUTO W/SCOPE: CPT

## 2024-01-23 PROCEDURE — 99285 EMERGENCY DEPT VISIT HI MDM: CPT

## 2024-01-23 PROCEDURE — 80048 BASIC METABOLIC PNL TOTAL CA: CPT

## 2024-01-23 PROCEDURE — 74177 CT ABD & PELVIS W/CONTRAST: CPT | Mod: MA

## 2024-01-23 PROCEDURE — 36415 COLL VENOUS BLD VENIPUNCTURE: CPT

## 2024-01-23 PROCEDURE — 71260 CT THORAX DX C+: CPT | Mod: MA

## 2024-01-23 PROCEDURE — 82803 BLOOD GASES ANY COMBINATION: CPT

## 2024-01-23 PROCEDURE — 70450 CT HEAD/BRAIN W/O DYE: CPT | Mod: MA

## 2024-01-23 PROCEDURE — 70498 CT ANGIOGRAPHY NECK: CPT | Mod: MA

## 2024-01-23 PROCEDURE — 85610 PROTHROMBIN TIME: CPT

## 2024-01-23 RX ORDER — BUPROPION HYDROCHLORIDE 150 MG/1
1 TABLET, EXTENDED RELEASE ORAL
Qty: 14 | Refills: 0
Start: 2024-01-23 | End: 2024-02-05

## 2024-01-23 RX ORDER — GABAPENTIN 400 MG/1
0 CAPSULE ORAL
Refills: 0 | DISCHARGE

## 2024-01-23 RX ORDER — BUPROPION HYDROCHLORIDE 150 MG/1
1 TABLET, EXTENDED RELEASE ORAL
Refills: 0 | DISCHARGE

## 2024-01-23 RX ORDER — GABAPENTIN 400 MG/1
1 CAPSULE ORAL
Qty: 42 | Refills: 0
Start: 2024-01-23 | End: 2024-02-05

## 2024-01-23 RX ADMIN — GABAPENTIN 400 MILLIGRAM(S): 400 CAPSULE ORAL at 05:30

## 2024-01-23 RX ADMIN — GABAPENTIN 400 MILLIGRAM(S): 400 CAPSULE ORAL at 12:45

## 2024-01-23 RX ADMIN — BUPROPION HYDROCHLORIDE 300 MILLIGRAM(S): 150 TABLET, EXTENDED RELEASE ORAL at 12:45

## 2024-01-23 RX ADMIN — Medication 650 MILLIGRAM(S): at 05:32

## 2024-01-23 NOTE — BH CONSULTATION LIAISON PROGRESS NOTE - NSBHFUPINTERVALCCFT_PSY_A_CORE
Advised to be compliant with the CPAP  Loose weight
"I feel safe. Im comfortable going home" 
"im wonderful" 
"im still depressed" 
im ok 
"im wonderful" 
"im good"

## 2024-01-23 NOTE — DISCHARGE NOTE NURSING/CASE MANAGEMENT/SOCIAL WORK - PATIENT PORTAL LINK FT
You can access the FollowMyHealth Patient Portal offered by [facility] by registering at the following website: [email or web address]. By joining [facility]’s FollowMyHealth portal, you will also be able to view your health information using other applications (apps) compatible with our system.

## 2024-01-23 NOTE — DISCHARGE NOTE PROVIDER - PROVIDER TOKENS
FREE:[LAST:[Psychiatry],PHONE:[(   )    -],FAX:[(   )    -],FOLLOWUP:[2 weeks]],FREE:[LAST:[PCP],PHONE:[(   )    -],FAX:[(   )    -],FOLLOWUP:[2 weeks]]

## 2024-01-23 NOTE — DISCHARGE NOTE PROVIDER - NSDCCPCAREPLAN_GEN_ALL_CORE_FT
PRINCIPAL DISCHARGE DIAGNOSIS  Diagnosis: Major depressive episode  Assessment and Plan of Treatment: Continue with medications as prescribed   Please follow-up with outpatient psychiatry      SECONDARY DISCHARGE DIAGNOSES  Diagnosis: 2019 novel coronavirus disease (COVID-19)  Assessment and Plan of Treatment: Symptomatic care as needed   Please follow-up with PCP as needed     PRINCIPAL DISCHARGE DIAGNOSIS  Diagnosis: Major depressive episode  Assessment and Plan of Treatment: Continue with medications as prescribed   Please follow-up with outpatient psychiatry  Please return to the ED if you have any thoughts of self harm or helplessness  You have been cleared for discharge home by psychiatry      SECONDARY DISCHARGE DIAGNOSES  Diagnosis: 2019 novel coronavirus disease (COVID-19)  Assessment and Plan of Treatment: Symptomatic care as needed   Repeat testing was negative  Outpatient follow up with your PCP

## 2024-01-23 NOTE — BH CONSULTATION LIAISON PROGRESS NOTE - NSBHFUPINTERVALHXFT_PSY_A_CORE
Patient is a 28 year old transgender female prefers to be called "Kassy"; domiciled with mother; single; noncaregiver; past psychiatric history of depression; prior psychiatric  hospitalizations; multiple prior suicide attempts; no known history of violence or arrests; recent use of acid and MDMA; PMH of hormone therapy; brought in by EMS; called by family; presenting after jumping from second floor balcony. Patient is a 28 year old transgender female prefers to be called "Kassy"; domiciled with mother; single; noncaregiver; past psychiatric history of depression; prior psychiatric  hospitalizations; multiple prior suicide attempts; no known history of violence or arrests; recent use of acid and MDMA; PMH of hormone therapy; brought in by EMS; called by family; presenting after jumping from second floor balcony.    No significant psychiatric events reported overnight. Remained on 1:1 for safety risk.  Pt evaluated at bedside. Patient was w/ bright affect, smiling to provider, and endorsed feeling safe to go home. Patient denied any thoughts, plans or intent to harm herself or other people. Discussed with patient a safety plan, to which she actively engaged in. She denied problems with sleep or appetite. Endorsed no adverse med effects.     Provider spoke with pt's mother Cassy [phone]: Per mother, patient is "getting better" compared to day of admission. Mother has no acute concerns for suicidality or homicidality.   Patient is a 28 year old transgender female prefers to be called "Kassy"; domiciled with mother; single; noncaregiver; past psychiatric history of depression; prior psychiatric  hospitalizations; multiple prior suicide attempts; no known history of violence or arrests; recent use of acid and MDMA; PMH of hormone therapy; brought in by EMS; called by family; presenting after jumping from second floor balcony.    No significant psychiatric events reported overnight. Remained on 1:1 for safety risk.  Pt evaluated at bedside. Patient was w/ bright affect, smiling to provider, and endorsed feeling safe to go home. Patient denied any thoughts, plans or intent to harm herself or other people. Discussed with patient a safety plan, to which she actively engaged in. She denied problems with sleep or appetite. Endorsed no adverse med effects.     Provider spoke with pt's mother Cassy [phone]: Per mother, patient is "getting better" compared to day of admission. Mother has no acute concerns for suicidality or homicidality but does express nervousness patient coming home and concerned she will stop her medication. mother educated and recommended to call 911 or bring patient to ED if patient appears to be a danger to self or others.

## 2024-01-23 NOTE — BH CONSULTATION LIAISON PROGRESS NOTE - NSICDXBHPRIMARYDX_PSY_ALL_CORE
Severe episode of recurrent major depressive disorder, without psychotic features   F33.2  

## 2024-01-23 NOTE — DISCHARGE NOTE NURSING/CASE MANAGEMENT/SOCIAL WORK - NSDCPEFALRISK_GEN_ALL_CORE
Adequate: hears normal conversation without difficulty
For information on Fall & Injury Prevention, visit: [email or web address] OR  [email or web address] OR  https://www.cdc.gov/steadi/patient.html

## 2024-01-23 NOTE — BH CONSULTATION LIAISON PROGRESS NOTE - NSBHMSEKNOWHOW_PSY_ALL_CORE
Current Events/Vocabulary
Current Events/Vocabulary
Vocabulary
Current Events/Vocabulary
Current Events/Vocabulary

## 2024-01-23 NOTE — BH CONSULTATION LIAISON PROGRESS NOTE - NSBHASSESSMENTFT_PSY_ALL_CORE
Patient is a 28 year old transgender female prefers to be called "Kassy"; domiciled with mother; single; noncaregiver; past psychiatric history of depression; prior psychiatric  hospitalizations; multiple prior suicide attempts; no known history of violence or arrests; recent use of acid and MDMA; PMH of hormone therapy; brought in by EMS; called by family; presenting after jumping from second floor balcony.     1/20: patient seen for follow up and found to be calm and cooperative. patient s/p suicide attempt , still with depresssed mood and requesting vol inpatient admission     1/22: Pt seen and evaluated for f/u. Patient is s/p suicide attempt. Endorsed depressed and anxious mood, with intermittent thoughts of passive suicidality. Pt was with constricted affect and appeared withdrawn at times during interview. Continues to request for inpatient psych voluntary admission.     1/23:    Recs:   - EKG and COVID swab done   -1 to 1 observation (patient given permission to use laptop and cellphone while on 1 to1 )   - Continue Gabapentin 400 mg TID  - Continue Wellbutrin   - PENDING voluntary inpatient psych admission Patient is a 28 year old transgender female prefers to be called "Kassy"; domiciled with mother; single; noncaregiver; past psychiatric history of depression; prior psychiatric  hospitalizations; multiple prior suicide attempts; no known history of violence or arrests; recent use of acid and MDMA; PMH of hormone therapy; brought in by EMS; called by family; presenting after jumping from second floor balcony.     1/20: patient seen for follow up and found to be calm and cooperative. patient s/p suicide attempt , still with depresssed mood and requesting vol inpatient admission     1/22: Pt seen and evaluated for f/u. Patient is s/p suicide attempt. Endorsed depressed and anxious mood, with intermittent thoughts of passive suicidality. Pt was with constricted affect and appeared withdrawn at times during interview. Continues to request for inpatient psych voluntary admission.     1/23:  Pt seen and evaluated for f/u. Patient endorsed choice to forgoing inpatient psychiatric treatment in favor for outpatient psychiatry tx. Patient shared she was hospital for a while, and at this time, feels safe to go home. Patient acknowledged her chronic depression, is aware that it needs psychiatric treatment, and reports she will follow up with new psychiatrist and therapist. Patient denied any active plans, intent or thoughts to harm herself. She denied HI/AVH, and denied any adverse med effects. She endorsed improved sleep and good appetite. During  encounter, patient was with bright affect, smiled appropriately to jokes,  able to identify reliable support systems she can reach out to during times of mental crisis (fiancee and friends Manohar and [employer] LGBTQ crisis line), and able to verbalize some future oriented thoughts (visiting his fiancee in [place], becoming an LGBTQ mental health counselor). Patient shared she does not see the benefit of an inpatient psych hospitalization at this time, and endorsed feeling stable since getting back on her psych med regimen.    During today's evaluation, provider was able to conclude that patient was not actively suicidal because not only was she denying suicidality, but her bright affect and joking with staff reinforced that she felt better. In addition, during her 10 day stay in the hospital while on 1:1 obs for safety risk, patient remained in good behavioral control and compliant with medications. Considering the above reasons, it appears that benefits of inpatient psychiatric admission is limited. However, given pt's hx of impulsiveness, drug use and SAs, there is a chronic risk for suicidality at some points in her life when stressed. Unfortunately, this is a function of her acute stressors over which treatment team has no control over. Hospitalizing pt at this time is not going to alter her chronic risk for suicidality, and thus is not indicated today. However, it is still important to decrease patient's overall suicide risk. Thus, treatment team is offering pt referrals to outpatient psychiatry and therapy so that pt may have somewhere to go and someone to manage her as depressive symptoms and stressors develop. Further, a safety plan was discussed for possible future suicidality (please see chart for details,  pt given original). Discussed recommendations with patient and mother, who are both agreeable for discharge.     Recs:   - Continue Gabapentin 400 mg TID  - Continue Wellbutrin 300 mg qD  - Follow up with outpatient psychiatry and psychotherapy  Patient is a 28 year old transgender female prefers to be called "Kassy"; domiciled with mother; single; noncaregiver; past psychiatric history of depression; prior psychiatric  hospitalizations; multiple prior suicide attempts; no known history of violence or arrests; recent use of acid and MDMA; PMH of hormone therapy; brought in by EMS; called by family; presenting after jumping from second floor balcony.     1/20: patient seen for follow up and found to be calm and cooperative. patient s/p suicide attempt , still with depresssed mood and requesting vol inpatient admission     1/22: Pt seen and evaluated for f/u. Patient is s/p suicide attempt. Endorsed depressed and anxious mood, with intermittent thoughts of passive suicidality. Pt was with constricted affect and appeared withdrawn at times during interview. Continues to request for inpatient psych voluntary admission.     1/23:  Pt seen and evaluated for f/u. Patient endorsed choice to forgoing inpatient psychiatric treatment in favor for outpatient psychiatry tx. Patient shared she was hospital for a while, and at this time, feels safe to go home. Patient acknowledged her chronic depression, is aware that it needs psychiatric treatment, and reports she will follow up with new psychiatrist and therapist. Patient denied any active plans, intent or thoughts to harm herself. She denied HI/AVH, and denied any adverse med effects. She endorsed improved sleep and good appetite. During  encounter, patient was with bright affect, smiled appropriately to jokes,  able to identify reliable support systems she can reach out to during times of mental crisis (fiancee and friends [employer] and [employer] LGBTQ crisis line), and able to verbalize some future oriented thoughts (visiting his fiancee in [place], becoming an LGBTQ mental health counselor). Patient shared she does not see the benefit of an inpatient psych hospitalization at this time, and endorsed feeling stable since getting back on her psych med regimen.    During today's evaluation, provider was able to conclude that patient was not actively suicidal because not only was she denying suicidality, but her bright affect and joking with staff reinforced that she felt better. In addition, during her 10 day stay in the hospital while on 1:1 obs for safety risk, patient remained in good behavioral control and compliant with medications. Considering the above reasons, it appears that benefits of inpatient psychiatric admission is limited. However, given pt's hx of impulsiveness, drug use and SAs, there is a chronic risk for suicidality at some points in her life when stressed. Unfortunately, this is a function of her acute stressors over which treatment team has no control over. Hospitalizing pt at this time is not going to alter her chronic risk for suicidality, and thus is not indicated today. However, it is still important to decrease patient's overall suicide risk. Thus, treatment team is offering pt referrals to outpatient psychiatry and therapy so that pt may have somewhere to go and someone to manage her as depressive symptoms and stressors develop. Further, a safety plan was discussed for possible future suicidality (please see chart for details,  pt given original). Discussed recommendations with patient and mother, who are both agreeable for discharge.     Recs:   - Continue Gabapentin 400 mg TID  - Continue Wellbutrin 300 mg qD  - Follow up with outpatient psychiatry and psychotherapy . Sw for referral to [facility]

## 2024-01-23 NOTE — DISCHARGE NOTE PROVIDER - CARE PROVIDER_API CALL
Psychiatry,   Phone: (   )    -  Fax: (   )    -  Follow Up Time: 2 weeks    PCP,   Phone: (   )    -  Fax: (   )    -  Follow Up Time: 2 weeks

## 2024-01-23 NOTE — DISCHARGE NOTE PROVIDER - ATTENDING DISCHARGE PHYSICAL EXAMINATION:
PHYSICAL EXAM:    GENERAL: young transgender female, sitting in bed, NAD  HEAD:  Atraumatic, Normocephalic  EYES: EOMI, PERRLA, conjunctiva and sclera clear  ENMT: Moist mucous membranes  NECK: Supple   NERVOUS SYSTEM:  Alert & Oriented X3, Motor Strength 5/5 B/L upper and lower extremities   CHEST/LUNG: Clear to auscultation bilaterally   HEART: Regular rate and rhythm; + S1/S2  ABDOMEN: Soft, Nontender, Nondistended; Bowel sounds present  EXTREMITIES:  no pedal edema

## 2024-01-23 NOTE — DISCHARGE NOTE NURSING/CASE MANAGEMENT/SOCIAL WORK - NSDCFUADDAPPT_GEN_ALL_CORE_FT
Please follow up for your mental health appointment with the below provider:    [facility] - [address] [place] [address] - [phone]  via telephone Please follow up for your mental health appointment with the below provider:    [facility] - [address] [place] [address] - [phone]  via telephone  1/24/24 at 1pm

## 2024-01-23 NOTE — DISCHARGE NOTE PROVIDER - NSDCMRMEDTOKEN_GEN_ALL_CORE_FT
gabapentin 300 mg oral tablet: orally 3 times a day  Wellbutrin 75 mg oral tablet: 1 tab(s) orally once a day  Wellbutrin  mg/24 hours oral tablet, extended release: 1 tab(s) orally once a day   buPROPion 300 mg/24 hours (XL) oral tablet, extended release: 1 tab(s) orally once a day  gabapentin 400 mg oral capsule: 1 cap(s) orally 3 times a day

## 2024-01-23 NOTE — BH CONSULTATION LIAISON PROGRESS NOTE - NSBHATTESTCOMMENTATTENDFT_PSY_A_CORE
agree
Agree with above.   Patient seen and found with significant improvement. Patient originally presented with suicidality in the context of substance use (LSD) and poor medication compliance. Patient has been followed daily by psychiatry, restarted on medications which were also adjusted and showed improvement daily. patient currently denying any s/h ideation and AVH and has been calm, cooperative with no behavioral disturbance. Collateral taken from mother by writer today who does agree patient has improved and has not reported any suicidality but does still have anxiety with patient being released due to history of  non compliance. mother educated on safety planning. Writer also took collateral from patients good friend Fox ([phone]) who feels patient has improved and is back to her baseline. At this time patient does not meet criteria for involuntary psychiatric hospitalizations and will be cleared with a plan to be referred to [facility] for continued psychiatric f/u and therapy.

## 2024-01-23 NOTE — BH CONSULTATION LIAISON PROGRESS NOTE - NSBHCHARTREVIEWVS_PSY_A_CORE FT
Vital Signs Last 24 Hrs  T(C): 36.9 (19 Jan 2024 08:29), Max: 37.5 (19 Jan 2024 04:30)  T(F): 98.5 (19 Jan 2024 08:29), Max: 99.5 (19 Jan 2024 04:30)  HR: 82 (19 Jan 2024 08:29) (82 - 97)  BP: 142/93 (19 Jan 2024 08:29) (127/83 - 142/93)  BP(mean): --  RR: 18 (19 Jan 2024 04:30) (18 - 20)  SpO2: 97% (19 Jan 2024 04:30) (97% - 99%)    Parameters below as of 19 Jan 2024 04:30  Patient On (Oxygen Delivery Method): room air    
Vital Signs Last 24 Hrs  T(C): 36.3 (21 Jan 2024 23:51), Max: 37.1 (21 Jan 2024 17:13)  T(F): 97.4 (21 Jan 2024 23:51), Max: 98.7 (21 Jan 2024 17:13)  HR: 70 (21 Jan 2024 23:51) (70 - 77)  BP: 101/48 (21 Jan 2024 23:51) (101/48 - 135/69)  BP(mean): --  RR: 18 (21 Jan 2024 23:51) (16 - 18)  SpO2: 99% (21 Jan 2024 23:51) (99% - 99%)    Parameters below as of 21 Jan 2024 23:51  Patient On (Oxygen Delivery Method): room air    
Vital Signs Last 24 Hrs  T(C): 36.7 (17 Jan 2024 11:02), Max: 37 (16 Jan 2024 16:30)  T(F): 98 (17 Jan 2024 11:02), Max: 98.6 (16 Jan 2024 16:30)  HR: 66 (17 Jan 2024 11:02) (66 - 82)  BP: 144/87 (17 Jan 2024 11:02) (108/67 - 144/87)  BP(mean): --  RR: 17 (17 Jan 2024 11:02) (17 - 18)  SpO2: 100% (17 Jan 2024 11:02) (98% - 100%)    Parameters below as of 17 Jan 2024 11:02  Patient On (Oxygen Delivery Method): room air    
Vital Signs Last 24 Hrs  T(C): 36.9 (18 Jan 2024 08:15), Max: 36.9 (17 Jan 2024 16:35)  T(F): 98.4 (18 Jan 2024 08:15), Max: 98.5 (17 Jan 2024 16:35)  HR: 88 (18 Jan 2024 08:15) (66 - 88)  BP: 142/81 (18 Jan 2024 08:15) (124/75 - 146/87)  BP(mean): --  RR: 18 (18 Jan 2024 08:15) (17 - 18)  SpO2: 98% (18 Jan 2024 08:15) (98% - 100%)    Parameters below as of 18 Jan 2024 08:15  Patient On (Oxygen Delivery Method): room air    
Vital Signs Last 24 Hrs  T(C): 36.8 (23 Jan 2024 04:17), Max: 36.8 (23 Jan 2024 04:17)  T(F): 98.2 (23 Jan 2024 04:17), Max: 98.2 (23 Jan 2024 04:17)  HR: 67 (23 Jan 2024 04:17) (67 - 84)  BP: 119/67 (23 Jan 2024 04:17) (119/67 - 122/78)  BP(mean): --  RR: 16 (23 Jan 2024 04:17) (16 - 18)  SpO2: 98% (23 Jan 2024 04:17) (98% - 98%)    Parameters below as of 23 Jan 2024 04:17  Patient On (Oxygen Delivery Method): room air    
Vital Signs Last 24 Hrs  T(C): 36.7 (20 Jan 2024 05:24), Max: 36.8 (19 Jan 2024 15:33)  T(F): 98.1 (20 Jan 2024 05:24), Max: 98.3 (19 Jan 2024 15:33)  HR: 73 (20 Jan 2024 05:24) (67 - 85)  BP: 119/65 (20 Jan 2024 05:24) (119/65 - 146/85)  BP(mean): --  RR: 18 (20 Jan 2024 05:24) (17 - 18)  SpO2: 99% (20 Jan 2024 05:24) (99% - 100%)    Parameters below as of 20 Jan 2024 05:24  Patient On (Oxygen Delivery Method): room air

## 2024-01-23 NOTE — BH CONSULTATION LIAISON PROGRESS NOTE - CURRENT MEDICATION
MEDICATIONS  (STANDING):  buPROPion XL (24-Hour) 300 milliGRAM(s) Oral daily  gabapentin 300 milliGRAM(s) Oral three times a day    MEDICATIONS  (PRN):  acetaminophen     Tablet .. 650 milliGRAM(s) Oral every 6 hours PRN Temp greater or equal to 38C (100.4F), Mild Pain (1 - 3)  aluminum hydroxide/magnesium hydroxide/simethicone Suspension 30 milliLiter(s) Oral every 4 hours PRN Dyspepsia  melatonin 3 milliGRAM(s) Oral at bedtime PRN Insomnia  ondansetron Injectable 4 milliGRAM(s) IV Push every 8 hours PRN Nausea and/or Vomiting  
MEDICATIONS  (STANDING):  buPROPion XL (24-Hour) 300 milliGRAM(s) Oral daily  gabapentin 400 milliGRAM(s) Oral three times a day    MEDICATIONS  (PRN):  acetaminophen     Tablet .. 650 milliGRAM(s) Oral every 6 hours PRN Temp greater or equal to 38C (100.4F), Mild Pain (1 - 3)  aluminum hydroxide/magnesium hydroxide/simethicone Suspension 30 milliLiter(s) Oral every 4 hours PRN Dyspepsia  melatonin 3 milliGRAM(s) Oral at bedtime PRN Insomnia  ondansetron Injectable 4 milliGRAM(s) IV Push every 8 hours PRN Nausea and/or Vomiting  
MEDICATIONS  (STANDING):  buPROPion XL (24-Hour) 300 milliGRAM(s) Oral daily  gabapentin 400 milliGRAM(s) Oral three times a day    MEDICATIONS  (PRN):  acetaminophen     Tablet .. 650 milliGRAM(s) Oral every 6 hours PRN Temp greater or equal to 38C (100.4F), Mild Pain (1 - 3)  aluminum hydroxide/magnesium hydroxide/simethicone Suspension 30 milliLiter(s) Oral every 4 hours PRN Dyspepsia  melatonin 3 milliGRAM(s) Oral at bedtime PRN Insomnia  ondansetron Injectable 4 milliGRAM(s) IV Push every 8 hours PRN Nausea and/or Vomiting  
MEDICATIONS  (STANDING):  buPROPion XL (24-Hour) 300 milliGRAM(s) Oral daily  gabapentin 300 milliGRAM(s) Oral three times a day    MEDICATIONS  (PRN):  acetaminophen     Tablet .. 650 milliGRAM(s) Oral every 6 hours PRN Temp greater or equal to 38C (100.4F), Mild Pain (1 - 3)  aluminum hydroxide/magnesium hydroxide/simethicone Suspension 30 milliLiter(s) Oral every 4 hours PRN Dyspepsia  melatonin 3 milliGRAM(s) Oral at bedtime PRN Insomnia  ondansetron Injectable 4 milliGRAM(s) IV Push every 8 hours PRN Nausea and/or Vomiting  
MEDICATIONS  (STANDING):  buPROPion XL (24-Hour) 300 milliGRAM(s) Oral daily  gabapentin 400 milliGRAM(s) Oral three times a day    MEDICATIONS  (PRN):  acetaminophen     Tablet .. 650 milliGRAM(s) Oral every 6 hours PRN Temp greater or equal to 38C (100.4F), Mild Pain (1 - 3)  aluminum hydroxide/magnesium hydroxide/simethicone Suspension 30 milliLiter(s) Oral every 4 hours PRN Dyspepsia  melatonin 3 milliGRAM(s) Oral at bedtime PRN Insomnia  ondansetron Injectable 4 milliGRAM(s) IV Push every 8 hours PRN Nausea and/or Vomiting  
MEDICATIONS  (STANDING):  buPROPion XL (24-Hour) 300 milliGRAM(s) Oral daily  estradiol Injectable 8 milliGRAM(s) IntraMuscular once  gabapentin 300 milliGRAM(s) Oral three times a day    MEDICATIONS  (PRN):  acetaminophen     Tablet .. 650 milliGRAM(s) Oral every 6 hours PRN Temp greater or equal to 38C (100.4F), Mild Pain (1 - 3)  aluminum hydroxide/magnesium hydroxide/simethicone Suspension 30 milliLiter(s) Oral every 4 hours PRN Dyspepsia  melatonin 3 milliGRAM(s) Oral at bedtime PRN Insomnia  ondansetron Injectable 4 milliGRAM(s) IV Push every 8 hours PRN Nausea and/or Vomiting

## 2024-01-23 NOTE — BH CONSULTATION LIAISON PROGRESS NOTE - NSBHMSEMUSCLE_PSY_A_CORE
Unable to assess
Normal muscle tone/strength
Unable to assess
Unable to assess

## 2024-01-23 NOTE — BH CONSULTATION LIAISON PROGRESS NOTE - NSBHMSEMOVE_PSY_A_CORE
No abnormal movements
Unable to assess

## 2024-01-23 NOTE — BH CONSULTATION LIAISON PROGRESS NOTE - NSBHCHARTREVIEWLAB_PSY_A_CORE FT
CBC1/13/24: WBC: 10.20  Hgb: 13.1 Hct: 37.3  Platelet Count: 284   BMP: 1/14/24 Na: 138  K: 3.8 Cl: 103 CO2: 22.0 BUN: 10.1 Cr: 0.82  
Vital Signs Last 24 Hrs  T(C): 36.7 (17 Jan 2024 11:02), Max: 37 (16 Jan 2024 16:30)  T(F): 98 (17 Jan 2024 11:02), Max: 98.6 (16 Jan 2024 16:30)  HR: 66 (17 Jan 2024 11:02) (66 - 82)  BP: 144/87 (17 Jan 2024 11:02) (108/67 - 144/87)  BP(mean): --  RR: 17 (17 Jan 2024 11:02) (17 - 18)  SpO2: 100% (17 Jan 2024 11:02) (98% - 100%)    Parameters below as of 17 Jan 2024 11:02  Patient On (Oxygen Delivery Method): room air    
CBC1/13/24: WBC: 10.20  Hgb: 13.1 Hct: 37.3  Platelet Count: 284   BMP: 1/14/24 Na: 138  K: 3.8 Cl: 103 CO2: 22.0 BUN: 10.1 Cr: 0.82  
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
## 2024-01-23 NOTE — BH CONSULTATION LIAISON PROGRESS NOTE - NSBHCHARTREVIEWINVESTIGATE_PSY_A_CORE FT
CT HEAD, CT ANGIO NECK, CT CERVICAL SPINE, CT ANGIO BRAIN  CTA OF THE [place] OF GASPAR AND NECK:    TECHNIQUE:  RAPID artificial intelligence was used for intracranial large vessel   occlusion.    Contrast: 90 cc Omnipaque 350 administered. 10 cc discarded.    CTA [place] OF GASPAR:  After the intravenous power injection of non-ionic contrast material,   serial thin sections were obtained through the intracranial circulation   on a multislice CT scanner.  Images were reformatted using a dedicated 3D   software package and viewed on a dedicated workstation in multiple planes.    CTA NECK:  After the intravenous power injection of non-ionic contrast material,   serial thin sections were obtained through the cervical circulation on a   multislice CT scanner.  Images were reformatted using a dedicated 3D   software package and viewed on a dedicated workstation in multiple planes.    COMPARISON EXAMINATION: None.    FINDINGS:    CTA [place] OF GASPAR:  ANTERIOR CIRCULATION  ICA  CAVERNOUS, SUPRACLINOID, BIFURCATION SEGMENTS: Patent without flow   limiting stenosis.    ANTERIOR CEREBRAL ARTERIES: Bilateral A1, anterior communicating and A2   anterior cerebral arteries are unremarkable in course and caliber without   flow limiting stenosis.    MIDDLE CEREBRAL ARTERIES: Patent bilateral M1, M2, and distal MCA   branches without flow limiting stenosis.      POSTERIOR CIRCULATION:  VERTEBRAL ARTERIES: Patent without flow limiting stenosis.  BASILAR ARTERY: Patent no flow limiting stenosis.  POSTERIOR CEREBRAL ARTERIES: Patent without flow limiting stenosis.      CTA NECK:  GREAT VESSELS: Visualized segments are patent, no flow limiting stenosis.    COMMON CAROTID ARTERIES:  RIGHT CCA: Patent without flow limiting stenosis.  LEFT CCA: Patent without flow limiting stenosis.    CAROTID BULBS:  RIGHT CB: Patent without flow limiting stenosis.  LEFT CB: Patent without flow limiting stenosis.    INTERNAL CAROTID ARTERIES:  RIGHT ICA: Patent no evidence for any hemodynamically significant   stenosis at the ICA origin by NASCET criteria.  LEFT ICA: Patent no evidence for any hemodynamically significant stenosis   at the ICA origin by NASCET criteria.    VERTEBRAL ARTERIES:  RIGHT VA: Patent no evidence for any flow limiting stenosis.  LEFT VA: Patent no evidence for any flow limiting stenosis.      SOFT TISSUES: Unremarkable.  BONES: Unremarkable.      IMPRESSIONS:    Head CT: No CT evidence of acute intracranial hemorrhage.    C-spine CT:  No acute fracture.    CTA COW:  Patent intracranial circulation without flow limiting stenosis.    CTA NECK: Patent, ECAs, ICAs, no  hemodynamically significant stenosis at    ICA origins by NASCET criteria.  Bilateral vertebral arteries are patent without flow limiting stenosis.    --- End of Report ---            LEIGH LING MD; Attending Radiologist  This document has been electronically signed. Jan 13 2024  3:01PM
CT HEAD, CT ANGIO NECK, CT CERVICAL SPINE, CT ANGIO BRAIN  CTA OF THE [place] OF GASPAR AND NECK:    TECHNIQUE:  RAPID artificial intelligence was used for intracranial large vessel   occlusion.    Contrast: 90 cc Omnipaque 350 administered. 10 cc discarded.    CTA [place] OF [place]ASPAR:  After the intravenous power injection of non-ionic contrast material,   serial thin sections were obtained through the intracranial circulation   on a multislice CT scanner.  Images were reformatted using a dedicated 3D   software package and viewed on a dedicated workstation in multiple planes.    CTA NECK:  After the intravenous power injection of non-ionic contrast material,   serial thin sections were obtained through the cervical circulation on a   multislice CT scanner.  Images were reformatted using a dedicated 3D   software package and viewed on a dedicated workstation in multiple planes.    COMPARISON EXAMINATION: None.    FINDINGS:    CTA [place] OF GASPAR:  ANTERIOR CIRCULATION  ICA  CAVERNOUS, SUPRACLINOID, BIFURCATION SEGMENTS: Patent without flow   limiting stenosis.    ANTERIOR CEREBRAL ARTERIES: Bilateral A1, anterior communicating and A2   anterior cerebral arteries are unremarkable in course and caliber without   flow limiting stenosis.    MIDDLE CEREBRAL ARTERIES: Patent bilateral M1, M2, and distal MCA   branches without flow limiting stenosis.      POSTERIOR CIRCULATION:  VERTEBRAL ARTERIES: Patent without flow limiting stenosis.  BASILAR ARTERY: Patent no flow limiting stenosis.  POSTERIOR CEREBRAL ARTERIES: Patent without flow limiting stenosis.      CTA NECK:  GREAT VESSELS: Visualized segments are patent, no flow limiting stenosis.    COMMON CAROTID ARTERIES:  RIGHT CCA: Patent without flow limiting stenosis.  LEFT CCA: Patent without flow limiting stenosis.    CAROTID BULBS:  RIGHT CB: Patent without flow limiting stenosis.  LEFT CB: Patent without flow limiting stenosis.    INTERNAL CAROTID ARTERIES:  RIGHT ICA: Patent no evidence for any hemodynamically significant   stenosis at the ICA origin by NASCET criteria.  LEFT ICA: Patent no evidence for any hemodynamically significant stenosis   at the ICA origin by NASCET criteria.    VERTEBRAL ARTERIES:  RIGHT VA: Patent no evidence for any flow limiting stenosis.  LEFT VA: Patent no evidence for any flow limiting stenosis.      SOFT TISSUES: Unremarkable.  BONES: Unremarkable.      IMPRESSIONS:    Head CT: No CT evidence of acute intracranial hemorrhage.    C-spine CT:  No acute fracture.    CTA COW:  Patent intracranial circulation without flow limiting stenosis.    CTA NECK: Patent, ECAs, ICAs, no  hemodynamically significant stenosis at    ICA origins by NASCET criteria.  Bilateral vertebral arteries are patent without flow limiting stenosis.    --- End of Report ---            LEIGH LING MD; Attending Radiologist  This document has been electronically signed. Jan 13 2024  3:01PM
  Ventricular Rate 75 BPM    Atrial Rate 75 BPM    P-R Interval 140 ms    QRS Duration 76 ms    Q-T Interval 404 ms    QTC Calculation(Bazett) 451 ms    P Axis 83 degrees    R Axis 74 degrees    T Axis 65 degrees    Diagnosis Line Normal sinus rhythm    Confirmed by JANETH CLINE ([id number]) on 1/20/2024 1:08:28 PM
  Ventricular Rate 75 BPM    Atrial Rate 75 BPM    P-R Interval 140 ms    QRS Duration 76 ms    Q-T Interval 404 ms    QTC Calculation(Bazett) 451 ms    P Axis 83 degrees    R Axis 74 degrees    T Axis 65 degrees    Diagnosis Line Normal sinus rhythm    Confirmed by JANETH CLINE ([id number]) on 1/20/2024 1:08:28 PM
CT HEAD, CT ANGIO NECK, CT CERVICAL SPINE, CT ANGIO BRAIN  CTA OF THE [place] OF [place]AS[place] AND NECK:    TECHNIQUE:  RAPID artificial intelligence was used for intracranial large vessel   occlusion.    Contrast: 90 cc Omnipaque 350 administered. 10 cc discarded.    CTA [place] OF [place]:  After the intravenous power injection of non-ionic contrast material,   serial thin sections were obtained through the intracranial circulation   on a multislice CT scanner.  Images were reformatted using a dedicated 3D   software package and viewed on a dedicated workstation in multiple planes.    CTA NECK:  After the intravenous power injection of non-ionic contrast material,   serial thin sections were obtained through the cervical circulation on a   multislice CT scanner.  Images were reformatted using a dedicated 3D   software package and viewed on a dedicated workstation in multiple planes.    COMPARISON EXAMINATION: None.    FINDINGS:    CTA [place] OF [place]AS[place]:  ANTERIOR CIRCULATION  ICA  CAVERNOUS, SUPRACLINOID, BIFURCATION SEGMENTS: Patent without flow   limiting stenosis.    ANTERIOR CEREBRAL ARTERIES: Bilateral A1, anterior communicating and A2   anterior cerebral arteries are unremarkable in course and caliber without   flow limiting stenosis.    MIDDLE CEREBRAL ARTERIES: Patent bilateral M1, M2, and distal MCA   branches without flow limiting stenosis.      POSTERIOR CIRCULATION:  VERTEBRAL ARTERIES: Patent without flow limiting stenosis.  BASILAR ARTERY: Patent no flow limiting stenosis.  POSTERIOR CEREBRAL ARTERIES: Patent without flow limiting stenosis.      CTA NECK:  GREAT VESSELS: Visualized segments are patent, no flow limiting stenosis.    COMMON CAROTID ARTERIES:  RIGHT CCA: Patent without flow limiting stenosis.  LEFT CCA: Patent without flow limiting stenosis.    CAROTID BULBS:  RIGHT CB: Patent without flow limiting stenosis.  LEFT CB: Patent without flow limiting stenosis.    INTERNAL CAROTID ARTERIES:  RIGHT ICA: Patent no evidence for any hemodynamically significant   stenosis at the ICA origin by NASCET criteria.  LEFT ICA: Patent no evidence for any hemodynamically significant stenosis   at the ICA origin by NASCET criteria.    VERTEBRAL ARTERIES:  RIGHT VA: Patent no evidence for any flow limiting stenosis.  LEFT VA: Patent no evidence for any flow limiting stenosis.      SOFT TISSUES: Unremarkable.  BONES: Unremarkable.      IMPRESSIONS:    Head CT: No CT evidence of acute intracranial hemorrhage.    C-spine CT:  No acute fracture.    CTA COW:  Patent intracranial circulation without flow limiting stenosis.    CTA NECK: Patent, ECAs, ICAs, no  hemodynamically significant stenosis at    ICA origins by NASCET criteria.  Bilateral vertebral arteries are patent without flow limiting stenosis.    --- End of Report ---            LEIGH LING MD; Attending Radiologist  This document has been electronically signed. Jan 13 2024  3:01PM

## 2024-01-23 NOTE — BH CONSULTATION LIAISON PROGRESS NOTE - NSBHPTASSESSDT_PSY_A_CORE
17-Jan-2024 16:19
22-Jan-2024 10:16
19-Jan-2024 08:36
23-Jan-2024 11:14
20-Jan-2024 14:04
18-Jan-2024 10:09

## 2024-02-06 ENCOUNTER — APPOINTMENT (OUTPATIENT)
Dept: FAMILY MEDICINE | Facility: CLINIC | Age: 29
End: 2024-02-06
Payer: MEDICAID

## 2024-02-06 VITALS
BODY MASS INDEX: 20.73 KG/M2 | SYSTOLIC BLOOD PRESSURE: 100 MMHG | WEIGHT: 140 LBS | HEIGHT: 69 IN | DIASTOLIC BLOOD PRESSURE: 60 MMHG | OXYGEN SATURATION: 99 % | HEART RATE: 73 BPM | RESPIRATION RATE: 16 BRPM

## 2024-02-06 DIAGNOSIS — Z00.00 ENCOUNTER FOR GENERAL ADULT MEDICAL EXAMINATION W/OUT ABNORMAL FINDINGS: ICD-10-CM

## 2024-02-06 DIAGNOSIS — F32.1 MAJOR DEPRESSIVE DISORDER, SINGLE EPISODE, MODERATE: ICD-10-CM

## 2024-02-06 DIAGNOSIS — H91.92 UNSPECIFIED HEARING LOSS, LEFT EAR: ICD-10-CM

## 2024-02-06 PROCEDURE — 99385 PREV VISIT NEW AGE 18-39: CPT

## 2024-02-06 RX ORDER — BUPROPION HYDROCHLORIDE 300 MG/1
300 TABLET, EXTENDED RELEASE ORAL
Refills: 0 | Status: ACTIVE | COMMUNITY
Start: 2024-02-06

## 2024-02-06 RX ORDER — GABAPENTIN 400 MG/1
400 CAPSULE ORAL 3 TIMES DAILY
Refills: 0 | Status: ACTIVE | COMMUNITY
Start: 2024-02-06

## 2024-02-06 NOTE — CURRENT MEDS
[FreeTextEntry1] :  patient is also prescribed estrogen and depo-previa injections by [facility]

## 2024-02-06 NOTE — PHYSICAL EXAM
[No Acute Distress] : no acute distress [Normal Outer Ear/Nose] : the outer ears and nose were normal in appearance [Normal Oropharynx] : the oropharynx was normal [No Respiratory Distress] : no respiratory distress  [Normal Rate] : normal rate  [Soft] : abdomen soft [Non Tender] : non-tender [Normal Bowel Sounds] : normal bowel sounds [No Joint Swelling] : no joint swelling [Coordination Grossly Intact] : coordination grossly intact [Speech Grossly Normal] : speech grossly normal [Normal Mood] : the mood was normal [Normal] : affect was normal and insight and judgment were intact

## 2024-02-06 NOTE — HISTORY OF PRESENT ILLNESS
[FreeTextEntry1] : ANNUAL, preferred name is Kassy.   [de-identified] : 28 y.o M--> F patient with pmhx of major depression presents today for annual health assessment. Patient reports eating vegan meals, drinking less than recommended amount of pure water daily, and exercising inconsistently.  Patient ran of gabapentin from the hospital today, Patient states hospital increased the dose of gabapentin originally prescribed by general surgeon for pain with dilation after gender reconstructive surgery 03/2023. patient states pain is localized and only occurs with patient dilating self-daily.  As per patient, general surgeon will no longer refill with concerns of cardiac effects. surgeon note will be requested for clarification. Patient scheduled a march 16th appointment with pain management to further address this issue. patient is also prescribed estrogen and dep[facility]-previa injections [facility]. Patient reports no palpitations, no SOB, no chest pain, no neck stiffness, no fever, no acute distress.

## 2024-02-06 NOTE — HEALTH RISK ASSESSMENT
[Several Days (1)] : 6.) Feeling bad about yourself, or that you are a failure, or have let yourself or your family down? Several days [1/2 of Days or More (2)] : 7.) Trouble concentrating on things, such as reading a newspaper or watching television? Half the days or more [Not at All (0)] : 8.) Moving or speaking so slowly that other people could have noticed, or the opposite, moving or speaking faster than usual? Not at all [Moderate] : severity of depression is moderate [Very Difficult] : How difficult have these problems made it for you to do your work, take care of things at home, or get along with people? Very difficult [[id number]] : 12

## 2024-02-06 NOTE — PLAN
[FreeTextEntry1] : FOLLOW UP:  surgeon note requested continue apt with pain management  ordered labs  Follow up time frame depending on results. follow up sooner if needed

## 2024-02-12 ENCOUNTER — LABORATORY RESULT (OUTPATIENT)
Age: 29
End: 2024-02-12

## 2024-02-22 ENCOUNTER — NON-APPOINTMENT (OUTPATIENT)
Age: 29
End: 2024-02-22

## 2024-02-29 ENCOUNTER — APPOINTMENT (OUTPATIENT)
Dept: OTOLARYNGOLOGY | Facility: CLINIC | Age: 29
End: 2024-02-29
Payer: MEDICAID

## 2024-02-29 VITALS — WEIGHT: 140 LBS | BODY MASS INDEX: 20.73 KG/M2 | HEIGHT: 69 IN

## 2024-02-29 DIAGNOSIS — H69.93 UNSPECIFIED EUSTACHIAN TUBE DISORDER, BILATERAL: ICD-10-CM

## 2024-02-29 DIAGNOSIS — H90.12 CONDUCTIVE HEARING LOSS, UNILATERAL, LEFT EAR, WITH UNRESTRICTED HEARING ON THE CONTRALATERAL SIDE: ICD-10-CM

## 2024-02-29 DIAGNOSIS — H90.3 SENSORINEURAL HEARING LOSS, BILATERAL: ICD-10-CM

## 2024-02-29 PROCEDURE — 92567 TYMPANOMETRY: CPT

## 2024-02-29 PROCEDURE — 92557 COMPREHENSIVE HEARING TEST: CPT

## 2024-02-29 PROCEDURE — 99203 OFFICE O/P NEW LOW 30 MIN: CPT | Mod: 25
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
## 2024-02-29 NOTE — DATA REVIEWED
[de-identified] : Right ear-Type A; WNL Left ear-Type Ad; mod.severe rising to normal conductive HL

## 2024-03-13 ENCOUNTER — NON-APPOINTMENT (OUTPATIENT)
Age: 29
End: 2024-03-13

## 2024-07-13 ENCOUNTER — EMERGENCY (EMERGENCY)
Facility: HOSPITAL | Age: 29
LOS: 0 days | Discharge: HOME | End: 2024-07-14
Attending: EMERGENCY MEDICINE
Payer: MEDICAID

## 2024-07-13 VITALS
HEIGHT: 70 IN | SYSTOLIC BLOOD PRESSURE: 134 MMHG | TEMPERATURE: 98 F | HEART RATE: 83 BPM | RESPIRATION RATE: 16 BRPM | WEIGHT: 130.07 LBS | DIASTOLIC BLOOD PRESSURE: 63 MMHG | OXYGEN SATURATION: 100 %

## 2024-07-13 DIAGNOSIS — R45.851 SUICIDAL IDEATIONS: ICD-10-CM
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
## 2024-07-13 DIAGNOSIS — Z88.0 ALLERGY STATUS TO PENICILLIN: ICD-10-CM
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
## 2024-07-13 DIAGNOSIS — F64.0 TRANSSEXUALISM: ICD-10-CM

## 2024-07-13 DIAGNOSIS — F32.9 MAJOR DEPRESSIVE DISORDER, SINGLE EPISODE, UNSPECIFIED: ICD-10-CM

## 2024-07-13 DIAGNOSIS — S60.512A ABRASION OF LEFT HAND, INITIAL ENCOUNTER: ICD-10-CM

## 2024-07-13 LAB — PCP SPEC-MCNC: SIGNIFICANT CHANGE UP

## 2024-07-13 PROCEDURE — 36415 COLL VENOUS BLD VENIPUNCTURE: CPT

## 2024-07-13 PROCEDURE — 80307 DRUG TEST PRSMV CHEM ANLYZR: CPT

## 2024-07-13 PROCEDURE — 99285 EMERGENCY DEPT VISIT HI MDM: CPT

## 2024-07-13 PROCEDURE — 93010 ELECTROCARDIOGRAM REPORT: CPT

## 2024-07-13 PROCEDURE — 99285 EMERGENCY DEPT VISIT HI MDM: CPT | Mod: 25

## 2024-07-13 PROCEDURE — 93005 ELECTROCARDIOGRAM TRACING: CPT

## 2024-07-13 PROCEDURE — 85025 COMPLETE CBC W/AUTO DIFF WBC: CPT

## 2024-07-13 PROCEDURE — 80053 COMPREHEN METABOLIC PANEL: CPT

## 2024-07-14 VITALS
HEART RATE: 77 BPM | TEMPERATURE: 98 F | RESPIRATION RATE: 16 BRPM | OXYGEN SATURATION: 98 % | SYSTOLIC BLOOD PRESSURE: 137 MMHG | DIASTOLIC BLOOD PRESSURE: 79 MMHG

## 2024-07-14 DIAGNOSIS — F32.9 MAJOR DEPRESSIVE DISORDER, SINGLE EPISODE, UNSPECIFIED: ICD-10-CM

## 2024-07-14 LAB
ALBUMIN SERPL ELPH-MCNC: 3.5 G/DL — SIGNIFICANT CHANGE UP (ref 3.3–5)
ALP SERPL-CCNC: 54 U/L — SIGNIFICANT CHANGE UP (ref 40–120)
ALT FLD-CCNC: 15 U/L — SIGNIFICANT CHANGE UP (ref 12–78)
AMPHET UR-MCNC: NEGATIVE — SIGNIFICANT CHANGE UP
ANION GAP SERPL CALC-SCNC: 4 MMOL/L — LOW (ref 5–17)
APAP SERPL-MCNC: <2 UG/ML — LOW (ref 10–30)
AST SERPL-CCNC: 11 U/L — LOW (ref 15–37)
BARBITURATES UR SCN-MCNC: NEGATIVE — SIGNIFICANT CHANGE UP
BASOPHILS # BLD AUTO: 0.04 K/UL — SIGNIFICANT CHANGE UP (ref 0–0.2)
BASOPHILS NFR BLD AUTO: 0.6 % — SIGNIFICANT CHANGE UP (ref 0–2)
BENZODIAZ UR-MCNC: NEGATIVE — SIGNIFICANT CHANGE UP
BILIRUB SERPL-MCNC: 0.2 MG/DL — SIGNIFICANT CHANGE UP (ref 0.2–1.2)
BUN SERPL-MCNC: 11 MG/DL — SIGNIFICANT CHANGE UP (ref 7–23)
CALCIUM SERPL-MCNC: 8.7 MG/DL — SIGNIFICANT CHANGE UP (ref 8.5–10.1)
CHLORIDE SERPL-SCNC: 109 MMOL/L — HIGH (ref 96–108)
CO2 SERPL-SCNC: 28 MMOL/L — SIGNIFICANT CHANGE UP (ref 22–31)
COCAINE METAB.OTHER UR-MCNC: NEGATIVE — SIGNIFICANT CHANGE UP
CREAT SERPL-MCNC: 1.03 MG/DL — SIGNIFICANT CHANGE UP (ref 0.5–1.3)
EGFR: 102 ML/MIN/1.73M2 — SIGNIFICANT CHANGE UP
EGFR: 102 ML/MIN/1.73M2 — SIGNIFICANT CHANGE UP
EOSINOPHIL # BLD AUTO: 0.39 K/UL — SIGNIFICANT CHANGE UP (ref 0–0.5)
EOSINOPHIL NFR BLD AUTO: 6.1 % — HIGH (ref 0–6)
ETHANOL SERPL-MCNC: <10 MG/DL — SIGNIFICANT CHANGE UP (ref 0–10)
FENTANYL UR QL SCN: NEGATIVE — SIGNIFICANT CHANGE UP
GLUCOSE SERPL-MCNC: 96 MG/DL — SIGNIFICANT CHANGE UP (ref 70–99)
HCT VFR BLD CALC: 32.2 % — LOW (ref 39–50)
HGB BLD-MCNC: 11.1 G/DL — LOW (ref 13–17)
IMM GRANULOCYTES NFR BLD AUTO: 0.3 % — SIGNIFICANT CHANGE UP (ref 0–0.9)
LYMPHOCYTES # BLD AUTO: 1.49 K/UL — SIGNIFICANT CHANGE UP (ref 1–3.3)
LYMPHOCYTES # BLD AUTO: 23.2 % — SIGNIFICANT CHANGE UP (ref 13–44)
MCHC RBC-ENTMCNC: 32.6 PG — SIGNIFICANT CHANGE UP (ref 27–34)
MCHC RBC-ENTMCNC: 34.5 GM/DL — SIGNIFICANT CHANGE UP (ref 32–36)
MCV RBC AUTO: 94.7 FL — SIGNIFICANT CHANGE UP (ref 80–100)
METHADONE UR-MCNC: NEGATIVE — SIGNIFICANT CHANGE UP
MONOCYTES # BLD AUTO: 0.86 K/UL — SIGNIFICANT CHANGE UP (ref 0–0.9)
MONOCYTES NFR BLD AUTO: 13.4 % — SIGNIFICANT CHANGE UP (ref 2–14)
NEUTROPHILS # BLD AUTO: 3.63 K/UL — SIGNIFICANT CHANGE UP (ref 1.8–7.4)
NEUTROPHILS NFR BLD AUTO: 56.4 % — SIGNIFICANT CHANGE UP (ref 43–77)
OPIATES UR-MCNC: NEGATIVE — SIGNIFICANT CHANGE UP
PCP UR-MCNC: NEGATIVE — SIGNIFICANT CHANGE UP
PLATELET # BLD AUTO: 247 K/UL — SIGNIFICANT CHANGE UP (ref 150–400)
POTASSIUM SERPL-MCNC: 3.6 MMOL/L — SIGNIFICANT CHANGE UP (ref 3.5–5.3)
POTASSIUM SERPL-SCNC: 3.6 MMOL/L — SIGNIFICANT CHANGE UP (ref 3.5–5.3)
PROT SERPL-MCNC: 6.5 GM/DL — SIGNIFICANT CHANGE UP (ref 6–8.3)
RBC # BLD: 3.4 M/UL — LOW (ref 4.2–5.8)
RBC # FLD: 13 % — SIGNIFICANT CHANGE UP (ref 10.3–14.5)
SALICYLATES SERPL-MCNC: <1.7 MG/DL — LOW (ref 2.8–20)
SODIUM SERPL-SCNC: 141 MMOL/L — SIGNIFICANT CHANGE UP (ref 135–145)
THC UR QL: POSITIVE — SIGNIFICANT CHANGE UP
WBC # BLD: 6.43 K/UL — SIGNIFICANT CHANGE UP (ref 3.8–10.5)
WBC # FLD AUTO: 6.43 K/UL — SIGNIFICANT CHANGE UP (ref 3.8–10.5)

## 2024-07-14 PROCEDURE — 90792 PSYCH DIAG EVAL W/MED SRVCS: CPT | Mod: 95

## 2024-10-01 NOTE — ED BEHAVIORAL HEALTH ASSESSMENT NOTE - MEDICAL RECORD REVIEWED
[Time Spent: ___ minutes] : I have spent [unfilled] minutes of time on the encounter which excludes teaching and separately reported services.
Yes

## 2025-02-10 ENCOUNTER — EMERGENCY (EMERGENCY)
Facility: HOSPITAL | Age: 30
LOS: 1 days | Discharge: HOME | End: 2025-02-10
Attending: EMERGENCY MEDICINE
Payer: COMMERCIAL

## 2025-02-10 VITALS
HEART RATE: 79 BPM | OXYGEN SATURATION: 98 % | DIASTOLIC BLOOD PRESSURE: 87 MMHG | SYSTOLIC BLOOD PRESSURE: 133 MMHG | HEIGHT: 66 IN | WEIGHT: 315 LBS | TEMPERATURE: 98 F | RESPIRATION RATE: 18 BRPM

## 2025-02-10 PROBLEM — F32.9 MAJOR DEPRESSIVE DISORDER, SINGLE EPISODE, UNSPECIFIED: Chronic | Status: ACTIVE | Noted: 2024-07-13

## 2025-02-10 PROBLEM — Z78.9 OTHER SPECIFIED HEALTH STATUS: Chronic | Status: ACTIVE | Noted: 2024-07-13

## 2025-02-10 LAB
ALBUMIN SERPL ELPH-MCNC: 4.1 G/DL — SIGNIFICANT CHANGE UP (ref 3.3–5.2)
ALP SERPL-CCNC: 67 U/L — SIGNIFICANT CHANGE UP (ref 40–120)
ALT FLD-CCNC: 14 U/L — SIGNIFICANT CHANGE UP
ANION GAP SERPL CALC-SCNC: 20 MMOL/L — HIGH (ref 5–17)
APAP SERPL-MCNC: <3 UG/ML — LOW (ref 10–26)
APPEARANCE UR: ABNORMAL
AST SERPL-CCNC: 19 U/L — SIGNIFICANT CHANGE UP
BACTERIA # UR AUTO: ABNORMAL /HPF
BASOPHILS # BLD AUTO: 0.08 K/UL — SIGNIFICANT CHANGE UP (ref 0–0.2)
BASOPHILS NFR BLD AUTO: 0.7 % — SIGNIFICANT CHANGE UP (ref 0–2)
BILIRUB SERPL-MCNC: 0.4 MG/DL — SIGNIFICANT CHANGE UP (ref 0.4–2)
BILIRUB UR-MCNC: NEGATIVE — SIGNIFICANT CHANGE UP
BUN SERPL-MCNC: 8.7 MG/DL — SIGNIFICANT CHANGE UP (ref 8–20)
CALCIUM SERPL-MCNC: 9.1 MG/DL — SIGNIFICANT CHANGE UP (ref 8.4–10.5)
CAST: 0 /LPF — SIGNIFICANT CHANGE UP (ref 0–4)
CHLORIDE SERPL-SCNC: 104 MMOL/L — SIGNIFICANT CHANGE UP (ref 96–108)
CO2 SERPL-SCNC: 16 MMOL/L — LOW (ref 22–29)
COLOR SPEC: YELLOW — SIGNIFICANT CHANGE UP
CREAT SERPL-MCNC: 1 MG/DL — SIGNIFICANT CHANGE UP (ref 0.5–1.3)
DIFF PNL FLD: NEGATIVE — SIGNIFICANT CHANGE UP
EGFR: 105 ML/MIN/1.73M2 — SIGNIFICANT CHANGE UP
EOSINOPHIL # BLD AUTO: 0.23 K/UL — SIGNIFICANT CHANGE UP (ref 0–0.5)
EOSINOPHIL NFR BLD AUTO: 1.9 % — SIGNIFICANT CHANGE UP (ref 0–6)
ETHANOL SERPL-MCNC: <10 MG/DL — SIGNIFICANT CHANGE UP (ref 0–9)
GLUCOSE SERPL-MCNC: 86 MG/DL — SIGNIFICANT CHANGE UP (ref 70–99)
GLUCOSE UR QL: NEGATIVE MG/DL — SIGNIFICANT CHANGE UP
HCT VFR BLD CALC: 37.7 % — LOW (ref 39–50)
HGB BLD-MCNC: 12.6 G/DL — LOW (ref 13–17)
IMM GRANULOCYTES NFR BLD AUTO: 0.4 % — SIGNIFICANT CHANGE UP (ref 0–0.9)
KETONES UR-MCNC: 15 MG/DL
LEUKOCYTE ESTERASE UR-ACNC: ABNORMAL
LYMPHOCYTES # BLD AUTO: 1.29 K/UL — SIGNIFICANT CHANGE UP (ref 1–3.3)
LYMPHOCYTES # BLD AUTO: 10.5 % — LOW (ref 13–44)
MCHC RBC-ENTMCNC: 32.5 PG — SIGNIFICANT CHANGE UP (ref 27–34)
MCHC RBC-ENTMCNC: 33.4 G/DL — SIGNIFICANT CHANGE UP (ref 32–36)
MCV RBC AUTO: 97.2 FL — SIGNIFICANT CHANGE UP (ref 80–100)
MONOCYTES # BLD AUTO: 0.97 K/UL — HIGH (ref 0–0.9)
MONOCYTES NFR BLD AUTO: 7.9 % — SIGNIFICANT CHANGE UP (ref 2–14)
NEUTROPHILS # BLD AUTO: 9.65 K/UL — HIGH (ref 1.8–7.4)
NEUTROPHILS NFR BLD AUTO: 78.6 % — HIGH (ref 43–77)
NITRITE UR-MCNC: NEGATIVE — SIGNIFICANT CHANGE UP
PH UR: 7 — SIGNIFICANT CHANGE UP (ref 5–8)
PLATELET # BLD AUTO: 292 K/UL — SIGNIFICANT CHANGE UP (ref 150–400)
POTASSIUM SERPL-MCNC: 4 MMOL/L — SIGNIFICANT CHANGE UP (ref 3.5–5.3)
POTASSIUM SERPL-SCNC: 4 MMOL/L — SIGNIFICANT CHANGE UP (ref 3.5–5.3)
PROT SERPL-MCNC: 7.1 G/DL — SIGNIFICANT CHANGE UP (ref 6.6–8.7)
PROT UR-MCNC: SIGNIFICANT CHANGE UP MG/DL
RBC # BLD: 3.88 M/UL — LOW (ref 4.2–5.8)
RBC # FLD: 12.2 % — SIGNIFICANT CHANGE UP (ref 10.3–14.5)
RBC CASTS # UR COMP ASSIST: 2 /HPF — SIGNIFICANT CHANGE UP (ref 0–4)
SALICYLATES SERPL-MCNC: <0.6 MG/DL — LOW (ref 10–20)
SODIUM SERPL-SCNC: 140 MMOL/L — SIGNIFICANT CHANGE UP (ref 135–145)
SP GR SPEC: 1.01 — SIGNIFICANT CHANGE UP (ref 1–1.03)
SQUAMOUS # UR AUTO: 6 /HPF — HIGH (ref 0–5)
UROBILINOGEN FLD QL: 1 MG/DL — SIGNIFICANT CHANGE UP (ref 0.2–1)
WBC # BLD: 12.27 K/UL — HIGH (ref 3.8–10.5)
WBC # FLD AUTO: 12.27 K/UL — HIGH (ref 3.8–10.5)
WBC UR QL: 33 /HPF — HIGH (ref 0–5)

## 2025-02-10 PROCEDURE — 93010 ELECTROCARDIOGRAM REPORT: CPT

## 2025-02-10 PROCEDURE — 90792 PSYCH DIAG EVAL W/MED SRVCS: CPT

## 2025-02-10 PROCEDURE — 12034 INTMD RPR S/TR/EXT 7.6-12.5: CPT

## 2025-02-10 PROCEDURE — 99223 1ST HOSP IP/OBS HIGH 75: CPT | Mod: 25

## 2025-02-10 RX ORDER — DIPHENHYDRAMINE HCL 25 MG
50 CAPSULE ORAL ONCE
Refills: 0 | Status: COMPLETED | OUTPATIENT
Start: 2025-02-10 | End: 2025-02-10

## 2025-02-10 RX ORDER — BUPROPION HYDROCHLORIDE 150 MG/1
450 TABLET, EXTENDED RELEASE ORAL DAILY
Refills: 0 | Status: DISCONTINUED | OUTPATIENT
Start: 2025-02-11 | End: 2025-02-18

## 2025-02-10 RX ORDER — GABAPENTIN 800 MG/1
400 TABLET ORAL THREE TIMES A DAY
Refills: 0 | Status: DISCONTINUED | OUTPATIENT
Start: 2025-02-11 | End: 2025-02-18

## 2025-02-10 RX ADMIN — Medication 1 MILLIGRAM(S): at 23:49

## 2025-02-10 RX ADMIN — Medication 50 MILLIGRAM(S): at 23:51

## 2025-02-10 NOTE — ED CDU PROVIDER INITIAL DAY NOTE - OBJECTIVE STATEMENT
pt is a 29 year old male to female (goes by JULIO mckenzie/her) presenting with self harm. She notes she has been having worsening anxiety and depression. She has been cutting her legs and then today had a deeper laceration to left lower leg. Denies any substance use. No recent illness. No hallucinations. Pt is on HRT and multiple psych meds but could not name them. Pt was seen here last year for suicide attempt and admitted to  psych    Patient seen by  will reevaluate in the morning.  Stable at this time.

## 2025-02-10 NOTE — ED BEHAVIORAL HEALTH ASSESSMENT NOTE - OTHER PAST PSYCHIATRIC HISTORY (INCLUDE DETAILS REGARDING ONSET, COURSE OF ILLNESS, INPATIENT/OUTPATIENT TREATMENT)
2 past IPH, no substance rehab/detox, 3 past ER visits for psych reasons Advancement-Rotation Flap Text: The defect edges were debeveled with a #15 scalpel blade.  Given the location of the defect, shape of the defect and the proximity to free margins an advancement-rotation flap was deemed most appropriate.  Using a sterile surgical marker, an appropriate flap was drawn incorporating the defect and placing the expected incisions within the relaxed skin tension lines where possible. The area thus outlined was incised deep to adipose tissue with a #15 scalpel blade.  The skin margins were undermined to an appropriate distance in all directions utilizing iris scissors.

## 2025-02-10 NOTE — ED PROVIDER NOTE - PSYCHIATRIC, MLM
Spoke with patient and informed her that CLINTON Rowan would send in Zenia. I also informed patient that ANDERSON Narvaez read through disability paperwork and it stated it would weigh more coming from a specialist. Patient is going to  paperwork and take it to her [place] doctor (neurologist). depressed, harm to self

## 2025-02-10 NOTE — ED BEHAVIORAL HEALTH ASSESSMENT NOTE - DESCRIPTION
bottom surgery 2023 as per chart review calm, cooperative    Vital Signs Last 24 Hrs  T(C): 36.8 (02-10-25 @ 17:49), Max: 36.8 (02-10-25 @ 15:54)  T(F): 98.3 (02-10-25 @ 17:49), Max: 98.3 (02-10-25 @ 17:49)  HR: 84 (02-10-25 @ 17:49) (79 - 84)  BP: 117/66 (02-10-25 @ 17:49) (117/66 - 133/87)  BP(mean): --  RR: 18 (02-10-25 @ 17:49) (18 - 18)  SpO2: 100% (02-10-25 @ 17:49) (98% - 100%) as above

## 2025-02-10 NOTE — ED ADULT TRIAGE NOTE - SOURCE OF INFORMATION
[Excellent] : ~his/her~  mood as  excellent [Yes] : Yes [2 - 4 times a month (2 pts)] : 2-4 times a month (2 points) [No falls in past year] : Patient reported no falls in the past year [0] : 2) Feeling down, depressed, or hopeless: Not at all (0) [PHQ-2 Negative - No further assessment needed] : PHQ-2 Negative - No further assessment needed [Audit-CScore] : 2 [[id number]] : 0 [Change in mental status noted] : No change in mental status noted [PapSmearDate] : 10/2023 Patient
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
## 2025-02-10 NOTE — ED CDU PROVIDER INITIAL DAY NOTE - NS_OBSORDERDATE_ED_A_ED
10-Feb-2025 22:32 Home Suture Removal Text: Patient was provided a home suture removal kit and will remove their sutures at home.  If they have any questions or difficulties they will call the office.

## 2025-02-10 NOTE — ED BEHAVIORAL HEALTH ASSESSMENT NOTE - RISK ASSESSMENT
Risk Factors inc depressive sx, hx of NSSI, hx of SA, impulsivity, poor frustration tolerance,  poor reactivity to stressors, substance use, family history of mental illness, hx of multiple past psychiatric hospitalizations.   Acutely risk is mitigated because pt currently denies SI/HI/VI/AVH/PI, is future oriented with PFs/RFL, has strong family support, compliant with treatment with positive therapeutic relationships, has no access to weapons/firearms, has no legal issues, no hx of aggression or violence, residential stability.

## 2025-02-10 NOTE — ED CDU PROVIDER INITIAL DAY NOTE - NSICDXPASTMEDICALHX_GEN_ALL_CORE_FT
PAST MEDICAL HISTORY:  Depression, major     Male-to-female transgender person     No pertinent past medical history     No pertinent past medical history

## 2025-02-10 NOTE — ED PROVIDER NOTE - OBJECTIVE STATEMENT
pt is a 29 year old male to female (goes by JULIO mckenzie/her) presenting with self harm. She notes she has been having worsening anxiety and depression. She has been cutting her legs and then today had a deeper laceration to left lower leg. Denies any substance use. No recent illness. No hallucinations. Pt is on HRT and multiple psych meds but could not name them. Pt was seen here last year for suicide attempt and admitted to  psych
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
## 2025-02-11 VITALS
TEMPERATURE: 98 F | OXYGEN SATURATION: 97 % | DIASTOLIC BLOOD PRESSURE: 66 MMHG | HEART RATE: 90 BPM | RESPIRATION RATE: 18 BRPM | SYSTOLIC BLOOD PRESSURE: 125 MMHG

## 2025-02-11 LAB
CULTURE RESULTS: SIGNIFICANT CHANGE UP
SPECIMEN SOURCE: SIGNIFICANT CHANGE UP

## 2025-02-11 PROCEDURE — 80053 COMPREHEN METABOLIC PANEL: CPT

## 2025-02-11 PROCEDURE — 87086 URINE CULTURE/COLONY COUNT: CPT

## 2025-02-11 PROCEDURE — 85025 COMPLETE CBC W/AUTO DIFF WBC: CPT

## 2025-02-11 PROCEDURE — 36415 COLL VENOUS BLD VENIPUNCTURE: CPT

## 2025-02-11 PROCEDURE — 80307 DRUG TEST PRSMV CHEM ANLYZR: CPT

## 2025-02-11 PROCEDURE — 93005 ELECTROCARDIOGRAM TRACING: CPT

## 2025-02-11 PROCEDURE — 81001 URINALYSIS AUTO W/SCOPE: CPT

## 2025-02-11 PROCEDURE — 99284 EMERGENCY DEPT VISIT MOD MDM: CPT | Mod: 25

## 2025-02-11 PROCEDURE — G0378: CPT

## 2025-02-11 PROCEDURE — 12034 INTMD RPR S/TR/EXT 7.6-12.5: CPT

## 2025-02-11 PROCEDURE — 99238 HOSP IP/OBS DSCHRG MGMT 30/<: CPT | Mod: 25

## 2025-02-11 PROCEDURE — 99214 OFFICE O/P EST MOD 30 MIN: CPT

## 2025-02-11 RX ADMIN — BUPROPION HYDROCHLORIDE 450 MILLIGRAM(S): 150 TABLET, EXTENDED RELEASE ORAL at 12:23

## 2025-02-11 RX ADMIN — GABAPENTIN 400 MILLIGRAM(S): 800 TABLET ORAL at 05:51

## 2025-02-11 NOTE — ED BEHAVIORAL HEALTH PROGRESS NOTE - COLLATERAL INFORMATION (NAME, PHONE, RELATIONSHIP):
Patients mother (Cassy Renae ([phone])) reports that patient recently broke up with his fiance of 2 yrs and 8 months two months ago and has been depressed but stable. Reports patient did not harm self until patient was speaking to another male recently from [place]. Mother reports she was not aware that patient had harmed herself yesterday 2/10/25 until Counselor Aminata called her and notified her that patient drove self to hospital after cutting self. Mother reports that patient has gender dysphoria that was diagnosed in 2020 when patient was away for college. Reports that she and entire family was very supportive of this change and present there for all of patients transformation surgery with first one taking place in March 2023. 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

## 2025-02-11 NOTE — ED BEHAVIORAL HEALTH PROGRESS NOTE - CASE SUMMARY/FORMULATION (CLEARLY DOCUMENT RATIONALE FOR DISPOSITION CHANGE)
Patient's presentation is consistent with adjustment disorder with disturbance of mood and conduct superimposed chronic MDD vs dysthymia; she notes that she doesn't think she has been diagnosed correctly and is working with her psychiatrist and counselor in order to find a more appropriate diagnosis as well as medications. There is likely some undiagnosed personality (borderline personality disorder vs cluster B traits?) and would benefit from continued tailored treatment.    Patient is well connected to outpatient care and has an upcoming appointment with her therapist later this week. Though her therapist acknowledges that the patient continues to engage in NSSIB, she doesn't have acute safety concerns as she notes that the patient hasn't indicated a desire to end her life. Family also does not endorse acute safety concerns. Patient continues to deny SI/HI, AVH and other perceptual disturbances and was able to appropriate participate in safety planning and verbalized alternative coping skills; she also demonstrates help-seeking behavior as she communicates regularly with her counselor and came to the ED on her own volition to receive treatment for her cuts when she realized she cut deeper than she thought she had. In light of this evaluation and collateral, there is no indication for acute psychiatric intervention at this time and patient is psychiatrically cleared for discharge.

## 2025-02-11 NOTE — ED BEHAVIORAL HEALTH PROGRESS NOTE - REFERRAL / APPOINTMENT DETAILS
f/u with outpatient psychiatrist and therapist, reports she is due for psychiatric follow up in a month and therapist this weekend

## 2025-02-11 NOTE — ED CDU PROVIDER SUBSEQUENT DAY NOTE - WET READ LAUNCH FT
10/4/2019      Tao DONNA Krish  [address] [address]        Dear Mr. Rutherford,    Please carefully read through the enclosed prep instructions at least 7 days prior to your procedure with Dr Michael Ramsey.  If you have questions regarding the instructions, please call [phone].    Procedure and arrival times may occasionally change.        Date:  October 7, 2019    Procedure/Arrival Time: [facility] will call you 2-3 business days prior with your procedure and arrival times.    Location:  [facility]  [address] [place]  [address]  [phone]  Please enter the main entrance, pass the glass elevator, and follow the orange signs for GI Services.         If you need to cancel or reschedule your procedure, please contact the  at the number listed below.    Thank you,    Effie [phone]  Surgery Scheduler  Pre-Admit Department                          Upper Gastrointestinal Endoscopy (EGD)  Pre-Procedure Instructions  Michael Ramsey MD    Please read and follow all instructions very carefully.   · You may not have any food, liquids, or antacid 8 hours prior to the endoscopy.     · Inform the doctor of any medication allergies, your present medications, and any problems with easy bleeding or bruising.    · An upper gastrointestinal endoscopy is a direct visual examination of your esophagus, stomach and the first part of your small intestine (duodenum). After numbing the back of your throat, you will be given medication to make you sleepy. The doctor will assist you to swallow a flexible video endoscope through which your doctor can examine the inside of your esophagus, stomach and intestine. Pieces of tissue (biopsies) may be removed and bleeding lesions may be cauterized (burned) or injected with medication.  · You will require 1 hour of observation after the procedure.    · Make arrangements for someone to drive you home after the  procedure because you will be very drowsy. Please make these arrangements in advance. A taxi is not acceptable transportation. If you do not have transportation arranged, we will not be able to do the procedure.    Possible complications of this procedure include bleeding, perforation or a reaction to medication. These risks are minimal, but if these or any other unusual symptoms appear, report them immediately to your doctor.    Please contact our office at [phone] if you have any additional questions and would like to speak with a nurse.   There are no Wet Read(s) to document.

## 2025-02-11 NOTE — ED CDU PROVIDER DISPOSITION NOTE - PATIENT PORTAL LINK FT
You can access the FollowMyHealth Patient Portal offered by [facility] by registering at the following website: [email or web address]. By joining [email or web address]’s FollowMyHealth portal, you will also be able to view your health information using other applications (apps) compatible with our system.

## 2025-02-11 NOTE — ED BEHAVIORAL HEALTH PROGRESS NOTE - DETAILS:
No acute events overnight, patient utilized PRN Ativan 1 mg and Benadryl 50 mg. Patient endorses feeling "okay" currently, admits to periods of anxiety and depression that she is working on with her therapist as well as her outpatient psychiatrist. She contests her current diagnoses, thinking that she's primarily depressed and may have POTS disease. She denies SI/HI, AVH and other perceptual disturbances currently.     Patients mother (Cassy Renae ([phone])) reports that patient recently broke up with his fiance of 2 yrs and 8 months two months ago and has been depressed but stable. Reports patient did not harm self until patient was speaking to another male recently from [place]. Mother reports she was not aware that patient had harmed herself yesterday 2/10/25 until Counselor Aminata called her and notified her that patient drove self to hospital after cutting self. Mother reports that patient has gender dysphoria that was diagnosed in 2020 when patient was away for college. Reports that she and entire family was very supportive of this change and present there for all of patients transformation surgery with first one taking place in March 2023. 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    Counselor from [facility] [employer] [facility] in [place] (Aminata Ojeda ([phone])) who reports that she meets with patient Kassy Odonnell (29 year old transgender female) weekly to discuss how patient has been feeling and see if patient needs any medication adjustments. She reports that patient is very impulsive and has suicidal thoughts but denies intent or plan. Reports that patient does not harm herself to end her life and more so does it out of impulse/coping with frustration.
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
## 2025-02-11 NOTE — ED ADULT NURSE REASSESSMENT NOTE - NS ED NURSE REASSESS COMMENT FT1
Patient in bed sleeping, safety maintained.
Received Ativan 1mg and Benadryl 50 mg for complain of insomnia. Patient noted sitting at the edge of the bed, no attempt to self harm or to harm others, safety maintained.
pt awake alert and oriented x4. multiple superficial lacerations lower extremities. pt was re-evaluated by psychiatry and cleared to be discharged home. pt endorses this plan.
pt received awake alert and oriented x4. pt endorses anxiety and panic attacks with thoughts to harm himself. pt has superficial lacs the lower extremities. pt was screened by security and oriented to the unit.
Reviewed discharge instruction and provided a copy of instructions.  Patient denies suicidal or homicidal ideations.  Patient agrees to return to local ED or call 911 if symptoms worsen or thoughts to harm self or others develop.
Assumed care of patient at 07:20.  Patient resting in bed awake. Patient reports feeling "good". Patient denies suicidal or homicidal ideations.  Patient verbalized understanding of plan of care for reassessment.  Safety of patient maintained.

## 2025-02-11 NOTE — ED BEHAVIORAL HEALTH PROGRESS NOTE - LACKING INFO FOR PSYCH DISPO DETAILS FREE TEXT
Counselor from [facility] in [place] (Sharona Ojeda ([phone])) who reports that she meets with patient Kassy Odonnell (29 year old transgender female) weekly to discuss how patient has been feeling and see if patient needs any medication adjustments. She reports that patient is very impulsive and has suicidal thoughts but denies intent or plan. Reports that patient does not harm herself to end her life and more so does it out of impulse/coping with frustration.  Counselor from [facility] in [place] (Aminata Ojeda ([phone])) who reports that she meets with patient Kassy Odonnell (29 year old transgender female) weekly to discuss how patient has been feeling and see if patient needs any medication adjustments. She reports that patient is very impulsive and has suicidal thoughts but denies intent or plan. Reports that patient does not harm herself to end her life and more so does it out of impulse/coping with frustration.  obtained collateral from outpatient counselor and from patient's family

## 2025-02-11 NOTE — ED CDU PROVIDER DISPOSITION NOTE - CLINICAL COURSE
30 y/o transgender F who identifies as Kassy presents for psych evaluation for self inflicted wounds and thoughts of self harm, has outpatient psychiatrist, medically cleared, evaluated by psych and deemed stable for discharge.

## 2025-02-26 ENCOUNTER — APPOINTMENT (OUTPATIENT)
Dept: FAMILY MEDICINE | Facility: CLINIC | Age: 30
End: 2025-02-26

## 2025-03-20 ENCOUNTER — APPOINTMENT (OUTPATIENT)
Dept: FAMILY MEDICINE | Facility: CLINIC | Age: 30
End: 2025-03-20
Payer: MEDICAID

## 2025-03-20 VITALS
SYSTOLIC BLOOD PRESSURE: 110 MMHG | RESPIRATION RATE: 17 BRPM | HEART RATE: 70 BPM | OXYGEN SATURATION: 99 % | DIASTOLIC BLOOD PRESSURE: 60 MMHG | TEMPERATURE: 98 F

## 2025-03-20 DIAGNOSIS — M25.569 PAIN IN UNSPECIFIED KNEE: ICD-10-CM

## 2025-03-20 DIAGNOSIS — R00.2 PALPITATIONS: ICD-10-CM

## 2025-03-20 DIAGNOSIS — E55.9 VITAMIN D DEFICIENCY, UNSPECIFIED: ICD-10-CM

## 2025-03-20 DIAGNOSIS — E46 UNSPECIFIED PROTEIN-CALORIE MALNUTRITION: ICD-10-CM

## 2025-03-20 DIAGNOSIS — Z11.3 ENCOUNTER FOR SCREENING FOR INFECTIONS WITH A PREDOMINANTLY SEXUAL MODE OF TRANSMISSION: ICD-10-CM

## 2025-03-20 DIAGNOSIS — Z00.00 ENCOUNTER FOR GENERAL ADULT MEDICAL EXAMINATION W/OUT ABNORMAL FINDINGS: ICD-10-CM

## 2025-03-20 PROCEDURE — 99395 PREV VISIT EST AGE 18-39: CPT

## 2025-03-20 RX ORDER — PROGESTERONE 100 MG/1
100 CAPSULE ORAL
Refills: 0 | Status: ACTIVE | COMMUNITY

## 2025-03-20 RX ORDER — ACETAMINOPHEN 500 MG/1
500 TABLET ORAL EVERY 8 HOURS
Qty: 42 | Refills: 0 | Status: ACTIVE | COMMUNITY
Start: 2025-03-20 | End: 1900-01-01

## 2025-03-20 RX ORDER — ERGOCALCIFEROL 1.25 MG/1
1.25 MG CAPSULE ORAL
Qty: 13 | Refills: 0 | Status: ACTIVE | COMMUNITY
Start: 2025-03-20 | End: 1900-01-01

## 2025-03-20 RX ORDER — ESTRADIOL 0.75 MG/1.25G
GEL, METERED TOPICAL
Refills: 0 | Status: ACTIVE | COMMUNITY

## 2025-05-02 ENCOUNTER — NON-APPOINTMENT (OUTPATIENT)
Age: 30
End: 2025-05-02

## 2025-05-02 ENCOUNTER — APPOINTMENT (OUTPATIENT)
Dept: CARDIOLOGY | Facility: CLINIC | Age: 30
End: 2025-05-02
Payer: MEDICAID

## 2025-05-02 VITALS
HEART RATE: 74 BPM | WEIGHT: 139.6 LBS | DIASTOLIC BLOOD PRESSURE: 78 MMHG | RESPIRATION RATE: 16 BRPM | BODY MASS INDEX: 20.62 KG/M2 | SYSTOLIC BLOOD PRESSURE: 122 MMHG | OXYGEN SATURATION: 98 %

## 2025-05-02 DIAGNOSIS — R00.2 PALPITATIONS: ICD-10-CM

## 2025-05-02 DIAGNOSIS — R94.31 ABNORMAL ELECTROCARDIOGRAM [ECG] [EKG]: ICD-10-CM

## 2025-05-02 PROCEDURE — 99203 OFFICE O/P NEW LOW 30 MIN: CPT | Mod: 25

## 2025-05-02 PROCEDURE — 93000 ELECTROCARDIOGRAM COMPLETE: CPT

## 2025-06-06 ENCOUNTER — EMERGENCY (EMERGENCY)
Facility: HOSPITAL | Age: 30
LOS: 1 days | End: 2025-06-06
Attending: EMERGENCY MEDICINE
Payer: COMMERCIAL

## 2025-06-06 VITALS
HEART RATE: 85 BPM | SYSTOLIC BLOOD PRESSURE: 121 MMHG | TEMPERATURE: 99 F | OXYGEN SATURATION: 99 % | DIASTOLIC BLOOD PRESSURE: 81 MMHG | RESPIRATION RATE: 17 BRPM

## 2025-06-06 DIAGNOSIS — F32.A DEPRESSION, UNSPECIFIED: ICD-10-CM

## 2025-06-06 LAB
ALBUMIN SERPL ELPH-MCNC: 4 G/DL — SIGNIFICANT CHANGE UP (ref 3.3–5.2)
ALP SERPL-CCNC: 51 U/L — SIGNIFICANT CHANGE UP (ref 40–120)
ALT FLD-CCNC: 15 U/L — SIGNIFICANT CHANGE UP
AMPHET UR-MCNC: NEGATIVE — SIGNIFICANT CHANGE UP
ANION GAP SERPL CALC-SCNC: 11 MMOL/L — SIGNIFICANT CHANGE UP (ref 5–17)
APPEARANCE UR: CLEAR — SIGNIFICANT CHANGE UP
AST SERPL-CCNC: 16 U/L — SIGNIFICANT CHANGE UP
BACTERIA # UR AUTO: NEGATIVE /HPF — SIGNIFICANT CHANGE UP
BARBITURATES UR SCN-MCNC: NEGATIVE — SIGNIFICANT CHANGE UP
BASOPHILS # BLD AUTO: 0.03 K/UL — SIGNIFICANT CHANGE UP (ref 0–0.2)
BASOPHILS NFR BLD AUTO: 0.5 % — SIGNIFICANT CHANGE UP (ref 0–2)
BENZODIAZ UR-MCNC: NEGATIVE — SIGNIFICANT CHANGE UP
BILIRUB SERPL-MCNC: <0.2 MG/DL — LOW (ref 0.4–2)
BILIRUB UR-MCNC: NEGATIVE — SIGNIFICANT CHANGE UP
BUN SERPL-MCNC: 9.3 MG/DL — SIGNIFICANT CHANGE UP (ref 8–20)
CALCIUM SERPL-MCNC: 8.6 MG/DL — SIGNIFICANT CHANGE UP (ref 8.4–10.5)
CAST: 3 /LPF — SIGNIFICANT CHANGE UP (ref 0–4)
CHLORIDE SERPL-SCNC: 103 MMOL/L — SIGNIFICANT CHANGE UP (ref 96–108)
CO2 SERPL-SCNC: 23 MMOL/L — SIGNIFICANT CHANGE UP (ref 22–29)
COCAINE METAB.OTHER UR-MCNC: NEGATIVE — SIGNIFICANT CHANGE UP
COLOR SPEC: YELLOW — SIGNIFICANT CHANGE UP
CREAT SERPL-MCNC: 0.87 MG/DL — SIGNIFICANT CHANGE UP (ref 0.5–1.3)
DIFF PNL FLD: NEGATIVE — SIGNIFICANT CHANGE UP
EGFR: 121 ML/MIN/1.73M2 — SIGNIFICANT CHANGE UP
EGFR: 121 ML/MIN/1.73M2 — SIGNIFICANT CHANGE UP
EOSINOPHIL # BLD AUTO: 0.26 K/UL — SIGNIFICANT CHANGE UP (ref 0–0.5)
EOSINOPHIL NFR BLD AUTO: 4.3 % — SIGNIFICANT CHANGE UP (ref 0–6)
FENTANYL UR QL SCN: NEGATIVE — SIGNIFICANT CHANGE UP
GLUCOSE SERPL-MCNC: 85 MG/DL — SIGNIFICANT CHANGE UP (ref 70–99)
GLUCOSE UR QL: NEGATIVE MG/DL — SIGNIFICANT CHANGE UP
HCT VFR BLD CALC: 36.1 % — LOW (ref 39–50)
HGB BLD-MCNC: 12.1 G/DL — LOW (ref 13–17)
IMM GRANULOCYTES # BLD AUTO: 0.03 K/UL — SIGNIFICANT CHANGE UP (ref 0–0.07)
IMM GRANULOCYTES NFR BLD AUTO: 0.5 % — SIGNIFICANT CHANGE UP (ref 0–0.9)
KETONES UR QL: ABNORMAL MG/DL
LEUKOCYTE ESTERASE UR-ACNC: NEGATIVE — SIGNIFICANT CHANGE UP
LYMPHOCYTES # BLD AUTO: 1.43 K/UL — SIGNIFICANT CHANGE UP (ref 1–3.3)
LYMPHOCYTES NFR BLD AUTO: 23.6 % — SIGNIFICANT CHANGE UP (ref 13–44)
MAGNESIUM SERPL-MCNC: 1.8 MG/DL — SIGNIFICANT CHANGE UP (ref 1.6–2.6)
MCHC RBC-ENTMCNC: 32.5 PG — SIGNIFICANT CHANGE UP (ref 27–34)
MCHC RBC-ENTMCNC: 33.5 G/DL — SIGNIFICANT CHANGE UP (ref 32–36)
MCV RBC AUTO: 97 FL — SIGNIFICANT CHANGE UP (ref 80–100)
METHADONE UR-MCNC: NEGATIVE — SIGNIFICANT CHANGE UP
MONOCYTES # BLD AUTO: 0.71 K/UL — SIGNIFICANT CHANGE UP (ref 0–0.9)
MONOCYTES NFR BLD AUTO: 11.7 % — SIGNIFICANT CHANGE UP (ref 2–14)
NEUTROPHILS # BLD AUTO: 3.6 K/UL — SIGNIFICANT CHANGE UP (ref 1.8–7.4)
NEUTROPHILS NFR BLD AUTO: 59.4 % — SIGNIFICANT CHANGE UP (ref 43–77)
NITRITE UR-MCNC: NEGATIVE — SIGNIFICANT CHANGE UP
NRBC # BLD AUTO: 0 K/UL — SIGNIFICANT CHANGE UP (ref 0–0)
NRBC # FLD: 0 K/UL — SIGNIFICANT CHANGE UP (ref 0–0)
NRBC BLD AUTO-RTO: 0 /100 WBCS — SIGNIFICANT CHANGE UP (ref 0–0)
OPIATES UR-MCNC: NEGATIVE — SIGNIFICANT CHANGE UP
PCP SPEC-MCNC: SIGNIFICANT CHANGE UP
PCP UR-MCNC: NEGATIVE — SIGNIFICANT CHANGE UP
PH UR: 5.5 — SIGNIFICANT CHANGE UP (ref 5–8)
PLATELET # BLD AUTO: 242 K/UL — SIGNIFICANT CHANGE UP (ref 150–400)
PMV BLD: 9.7 FL — SIGNIFICANT CHANGE UP (ref 7–13)
POTASSIUM SERPL-MCNC: 4.5 MMOL/L — SIGNIFICANT CHANGE UP (ref 3.5–5.3)
POTASSIUM SERPL-SCNC: 4.5 MMOL/L — SIGNIFICANT CHANGE UP (ref 3.5–5.3)
PROT SERPL-MCNC: 6.3 G/DL — LOW (ref 6.6–8.7)
PROT UR-MCNC: 30 MG/DL
RBC # BLD: 3.72 M/UL — LOW (ref 4.2–5.8)
RBC # FLD: 12.3 % — SIGNIFICANT CHANGE UP (ref 10.3–14.5)
RBC CASTS # UR COMP ASSIST: 1 /HPF — SIGNIFICANT CHANGE UP (ref 0–4)
SODIUM SERPL-SCNC: 137 MMOL/L — SIGNIFICANT CHANGE UP (ref 135–145)
SP GR SPEC: >1.03 — HIGH (ref 1–1.03)
SQUAMOUS # UR AUTO: 7 /HPF — HIGH (ref 0–5)
T3 SERPL-MCNC: 125 NG/DL — SIGNIFICANT CHANGE UP (ref 80–200)
T4 AB SER-ACNC: 5.9 UG/DL — SIGNIFICANT CHANGE UP (ref 4.5–12)
T4 FREE SERPL-MCNC: 0.9 NG/DL — SIGNIFICANT CHANGE UP (ref 0.9–1.8)
THC UR QL: POSITIVE
TSH SERPL-MCNC: 4.15 UIU/ML — SIGNIFICANT CHANGE UP (ref 0.27–4.2)
UROBILINOGEN FLD QL: 1 MG/DL — SIGNIFICANT CHANGE UP (ref 0.2–1)
WBC # BLD: 6.06 K/UL — SIGNIFICANT CHANGE UP (ref 3.8–10.5)
WBC # FLD AUTO: 6.06 K/UL — SIGNIFICANT CHANGE UP (ref 3.8–10.5)
WBC UR QL: 3 /HPF — SIGNIFICANT CHANGE UP (ref 0–5)

## 2025-06-06 PROCEDURE — 84480 ASSAY TRIIODOTHYRONINE (T3): CPT

## 2025-06-06 PROCEDURE — 99284 EMERGENCY DEPT VISIT MOD MDM: CPT | Mod: 25

## 2025-06-06 PROCEDURE — 93010 ELECTROCARDIOGRAM REPORT: CPT

## 2025-06-06 PROCEDURE — 99285 EMERGENCY DEPT VISIT HI MDM: CPT

## 2025-06-06 PROCEDURE — 81001 URINALYSIS AUTO W/SCOPE: CPT

## 2025-06-06 PROCEDURE — 90792 PSYCH DIAG EVAL W/MED SRVCS: CPT

## 2025-06-06 PROCEDURE — 84443 ASSAY THYROID STIM HORMONE: CPT

## 2025-06-06 PROCEDURE — 80053 COMPREHEN METABOLIC PANEL: CPT

## 2025-06-06 PROCEDURE — 80307 DRUG TEST PRSMV CHEM ANLYZR: CPT

## 2025-06-06 PROCEDURE — 93005 ELECTROCARDIOGRAM TRACING: CPT

## 2025-06-06 PROCEDURE — 85025 COMPLETE CBC W/AUTO DIFF WBC: CPT

## 2025-06-06 PROCEDURE — 84436 ASSAY OF TOTAL THYROXINE: CPT

## 2025-06-06 PROCEDURE — 84439 ASSAY OF FREE THYROXINE: CPT

## 2025-06-06 PROCEDURE — 36415 COLL VENOUS BLD VENIPUNCTURE: CPT

## 2025-06-06 PROCEDURE — 83735 ASSAY OF MAGNESIUM: CPT

## 2025-06-06 NOTE — ED BEHAVIORAL HEALTH ASSESSMENT NOTE - HPI (INCLUDE ILLNESS QUALITY, SEVERITY, DURATION, TIMING, CONTEXT, MODIFYING FACTORS, ASSOCIATED SIGNS AND SYMPTOMS)
Schizoaffective Disorder   WHAT YOU NEED TO KNOW:   What is schizoaffective disorder? Schizoaffective disorder is a long-term mental illness that may change how you think, feel, and act around others  Schizoaffective disorder involves both psychosis (loss of reality), along with depression or jeane  You may not know what is real and what is not real    What causes schizoaffective disorder? The cause of schizoaffective disorder in not known  It is thought there may be an imbalance in chemicals that help control movement, thought, and mood  What increases my risk of schizoaffective disorder? · You are female  · You were born during the winter months  · You had psychological stress from abuse, disaster, or major life change  · Your brain did not develop normally before you were born  · You have a family member with schizophrenia, a mood disorder, or schizoaffective disorder  What are the signs and symptoms of schizoaffective disorder? · Delusions: These are false ideas  You may believe that someone is spying on you, or that you are someone famous  · Hallucinations: You see, feel, taste, hear, or smell something that is not real     · Disordered thinking and speech:  When you talk, you move from one subject to another in a way that does not make sense  You may make up your own words or sounds  · Negative symptoms: These include lack of expression, eye contact, and words  You may not be able to start and continue a planned activity  You may have little interest in work or social activities  · Depression:  You are depressed most of the day or nearly every day  You may lose or gain weight, or have trouble sleeping or concentrating  There may be feeling of hopelessness and suicidal thoughts  · Manic behavior: These are periods of increased self-esteem and energy with little to no sleep  You may talk more than usual  You may have racing thoughts or be easily distracted   You may have more interest in social activities  How is schizoaffective disorder diagnosed? Your healthcare provider will perform a psychiatric assessment  He will ask if you have a history of psychological trauma, such as physical, sexual, or mental abuse  He will ask if you were given the care that you needed when you needed it  He will ask if you have a history of alcohol or drug abuse  Your healthcare provider will ask you if you want to hurt or kill yourself or others  He will also ask about your hobbies and goals, the people in your life who support you, and how you feel about treatment  The answers to these questions help healthcare providers plan your treatment  Which medicines are used to treat schizoaffective disorder? · Antipsychotics: These help decrease psychotic symptoms or severe agitation  You may need antiparkinson medicine to control muscle stiffness, twitches, and restlessness caused by antipsychotic medicines  · Antianxiety medicine: This medicine may be given to decrease anxiety and help you feel calm and relaxed  · Antidepressants: These help decrease or stop the symptoms of depression, anxiety, and behavior problems  · Mood stabilizers: These control mood swings  · Anticonvulsants: These control seizures, decrease violent behavior, and control mood swings  · Blood pressure medicines: These may be used to help decrease motor tics (uncontrolled movements)  They may also help you feel calmer, more focused, and less irritable  · Anticholinergics: This decreases the side effects of other medicines  Which therapies are used to treat schizoaffective disorder? · Assertive community treatment:  A team of healthcare providers and support groups in your community help you with your therapy  · Cognitive behavior therapy: This therapy helps you to change certain behaviors  It will help you handle symptoms such as hallucinations and delusions   It can help you learn how to get along with others, and help you cope with and handle your disease  · Compliance therapy: This is a therapy to help find ways to make it easier for you to take your medicines and get treatment  · Counseling: This helps you learn self-care, how to decrease your symptoms, and prevent them from coming back  · Family intervention: This program lets your family be part of your therapy  · Skills training: This training helps you learn how to get along with other people  You will also learn how to do everyday activities and skills you need to live on your own  · Supported employment: This is a form of therapy where you are placed into a job that fits your skills  It will help give you independence and self-confidence  · Electroconvulsive therapy: This is a type of shock therapy, also called ECT  This therapy passes a small amount of electricity through the brain  How can I help manage schizoaffective disorder? The following may help you feel better or prevent symptoms of schizoaffective disorder from coming back:  · Find support for yourself and your family:  Talk with others to help you cope with your illness better  This may also help to improve how you relate to others  · Keep all medical appointments: This will help manage your disease and the side-effects from medicines you may be taking  · Take your medicines as directed:  Put your medicines in a pillbox placed in an area you can easily see  Use a watch with an alarm to help you remember when it is time to take your medicine  Tell your healthcare provider if you know or think you might be pregnant because your medication could affect the baby  Do not stop taking your medicines without your healthcare provider's okay  A sudden stop can cause serious medical problems  · Watch for early signs of a relapse and seek help immediately:      ¨ How you think, feel, and see things has changed      ¨ You start to behave differently, or others tell you they notice a change  ¨ You become more nervous and upset, but do not know why  ¨ You eat less or have trouble sleeping  ¨ You have little or no interest in friends or activities  What are the risks of schizoaffective disorder? Even with treatment, your symptoms may come back or not go away  If schizoaffective disorder is left untreated, your condition may get worse  It may affect the way you think of yourself and how you get along with others  Your condition may make it hard for you to do your normal activities  You may be at increased risk for diabetes and heart and lung disease  Your risk for alcohol or drug abuse increases  You may have thoughts of hurting or killing yourself or others  Where can I find support and more information? · [employer]  [address] [facility], [facility] [address] , [address]  Phone: [phone]  Phone: [phone]  Web Address: [place]  · [address] [place] [address], Public Information & Communication Branch  [address] [place] [address], [address] [facility], [address]  [place] [address]   Phone: [phone]  Phone: [phone]  Web Address: [facility]  When should I contact my healthcare provider? · You think you are having a relapse  · You are having side effects from your medicines, or they are not helping  · You are not sleeping well or are sleeping more than usual     · You cannot eat or are eating more than usual     · You have muscle spasms, stiffness, or trouble walking  · Your sad feelings or thoughts change the way you function during the day  · You have questions or concerns about your condition or care  When should I seek immediate care or call [facility]? · You feel like hurting or killing yourself or others  · You feel that your condition is getting worse  · You feel very upset, threaten someone, or feel violent       · You suddenly have changes in your vision  · You suddenly have chest pain, trouble breathing, or a fever  CARE AGREEMENT:   You have the right to help plan your care  Learn about your health condition and how it may be treated  Discuss treatment options with your caregivers to decide what care you want to receive  You always have the right to refuse treatment  The above information is an  only  It is not intended as medical advice for individual conditions or treatments  Talk to your doctor, nurse or pharmacist before following any medical regimen to see if it is safe and effective for you  © 2017 [facility] [facility] Information is for End User's use only and may not be sold, redistributed or otherwise used for commercial purposes  All illustrations and images included in CareNotes® are the copyrighted property of A D A M , Inc  or Adriel Arenas  28 y/o M trans-f, domiciled, employed,  presents to ED for increasing fatigue x 2 months, PPH depression, NSSIB, SI vs SA, multiple prior psych admissions. Pt follows with [facility] and states she was started on Abilify about a month ago, also takes gabapentin 400 mg TID and Wellbutrin 300 mg q.d. Pt states she has "depression and always had a plan, but no intent to act on it". Pt states her fiance broke up with her 3 months ago and this has been causing her more stress which caused her to harm herself more, last episode of NSSIB was 2 days ago, pt reports fatigue was so bad she cut herself to "feel something and get out of bed". 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 Pt reports less sleep secondary to the fatigue and has a poor appetite normally. Pt denies worsening depression, anhedonia, david, si/hi/i. 30 y/o M trans-f, domiciled, employed,  presents to ED for increasing fatigue x 2 months, PPH depression, NSSIB, SI vs SA, multiple prior psych admissions. Pt follows with [facility] and states she was started on Abilify about a month ago, also takes gabapentin 400 mg TID and Wellbutrin 300 mg q.d. Pt states she has "depression and always had a plan, but no intent to act on it". Pt states her fiance broke up with her 3 months ago and this has been causing her more stress which caused her to harm herself more, last episode of NSSIB was 2 days ago, pt reports fatigue was so bad she cut herself to "feel something and get out of bed". 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 Pt reports less sleep secondary to the fatigue and has a poor appetite normally. Pt denies worsening depression, anhedonia, david, si/hi/i.  adamantly denies any current or recent active suicidal ideations plan or intent.  participated in formal safety planning (able to identify warning signs that a crisis may be developing, internal coping strategies, people/social settings that provide distraction, people who can help, professionals/agencies to contact during a crisis, how to make the environment safer, and important aspects/entities that make life worth living).    collateral information obtained from father over phone  Comfortable with patient's discharge back home. Does not have any safety concerns regarding the patient. Does not think that patient is acutely dangerous to self or anyone else. Advised that the patient should go to the nearest ER or call 911, for any of the followin) agitation aggression or anxiety, 2) suicidal or homicidal thoughts 3) worsening of symptoms or 4) side effects of medications.

## 2025-06-06 NOTE — ED ADULT NURSE NOTE - OBJECTIVE STATEMENT
Pt presents to the ED A&Ox4 c/o weakness and fatigue for the last month worsening over the last week. Pt reports psychiatric history on medication, will not disclose what. Pt reports no bleeding noted. Pt RR even and unlabored, NAD noted. Pt presents to the ED A&Ox4 c/o weakness and fatigue for the last month worsening over the last week. Pt reports psychiatric history on medication, will not disclose what. Pt reports no bleeding noted. Pt RR even and unlabored, NAD noted. Pt presents with cuts to arms b/l not bleeding at this time.

## 2025-06-06 NOTE — ED PROVIDER NOTE - OBJECTIVE STATEMENT
30 y/o M c/o generalized weakness and sensation that he's going to pass out.  He states that he's had symptoms for a long time.  Patient last had blood drawn a few months ago and was anemic.  He is not taking any medications.  Denies chest pain, shortness of breath.

## 2025-06-06 NOTE — ED PROVIDER NOTE - CARE PROVIDER_API CALL
Nazanin Alfonso [facility]  Medical Oncology  [address] [place] [address]  Phone: [phone]  Fax: [phone]  Follow Up Time:

## 2025-06-06 NOTE — ED BEHAVIORAL HEALTH ASSESSMENT NOTE - SUMMARY
28 y/o M trans-f, domiciled, employed,  presents to ED for increasing fatigue x 2 months, PPH depression, NSSIB, SI vs SA, multiple prior psych admissions. Pt follows with [facility] and states she was started on Abilify about a month ago, also takes gabapentin 400 mg TID and Wellbutrin 300 mg q.d. Pt states she has "depression and always had a plan, but no intent to act on it". Pt states her fiance broke up with her 3 months ago and this has been causing her more stress which caused her to harm herself more, last episode of NSSIB was 2 days ago, pt reports fatigue was so bad she cut herself to "feel something and get out of bed". 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 Pt reports less sleep secondary to the fatigue and has a poor appetite normally. Pt denies worsening depression, anhedonia, david, si/hi/i. Pt sitting comfortably on bed and engages in conversation. She repeatedly states she hear for increased fatigue. Pt a moderate risk for self harm due to increase stressors in her life but denies any intention. Pt has NSSIB on her forearms b/l and her lower legs all superficial.     - follow up with [facility]  - continue home medications as prescribed 28 y/o M trans-f, domiciled, employed,  presents to ED for increasing fatigue x 2 months, PPH depression, NSSIB, SI vs SA, multiple prior psych admissions. Pt follows with [facility] and states she was started on Abilify about a month ago, also takes gabapentin 400 mg TID and Wellbutrin 300 mg q.d. Pt states she has "depression and always had a plan, but no intent to act on it".  Although the patient’s social stressors, chronic mental health issues, nssib, and substance use – all place her at risk of future self harm, the patient currently denies any suicidal/homicidal ideations plan or intent, is future oriented, forward looking, able to cite reasons for continued living, with stated obligations to family and friends, expresses an interest in outpatient treatment.  Therefore, although the patient has an elevated baseline risk of self harm, acutely the patient is at low risk of self harm. Patient denies any acute psychiatric issues concerns or complaints. The patient has declined voluntary admission and does not meet criteria for involuntary commitment to an acute inpatient psychiatric unit. Combined psychopharmacology and outpatient psychotherapy are the primary treatment modalities that are most likely to assist the patient in developing more productive means of managing her mental health conditions and navigating interpersonal relationships/stressors/substance use, rather than an inpatient psychiatric admission.

## 2025-06-06 NOTE — ED BEHAVIORAL HEALTH ASSESSMENT NOTE - DETAILS
n/a self referred Advised patient to go to nearest ER or call 911, for any of the followin) agitation aggression or anxiety, 2) suicidal or homicidal thoughts 3) worsening of symptoms or 4) side effects of medications; BH SAFETY PLAN COMPLETED, WILL BE PRINTED OUT FOR PATIENT UPON DISCHARGE

## 2025-06-06 NOTE — ED BEHAVIORAL HEALTH ASSESSMENT NOTE - NS ED BHA REVIEW OF ED CHART AVAILABLE INVESTIGATIONS REVIEWED
Yes Melolabial Transposition Flap Text: The defect edges were debeveled with a #15 scalpel blade. Given the location of the defect and the proximity to free margins a melolabial flap was deemed most appropriate. Using a sterile surgical marker, an appropriate melolabial transposition flap was drawn incorporating the defect. The area thus outlined was incised deep to adipose tissue with a #15 scalpel blade. The skin margins were undermined to an appropriate distance in all directions utilizing iris scissors. Following this, the designed flap was carried over into the primary defect and sutured into place.

## 2025-06-06 NOTE — ED PROVIDER NOTE - CPE EDP GASTRO NORM
Left message for them call back with any questions they may have, our phone number, reminders, and when we plan to call again.    Shea De León, RT, Chronic Pulmonary Disease Specialist    
normal...

## 2025-06-06 NOTE — ED BEHAVIORAL HEALTH ASSESSMENT NOTE - DESCRIPTION
30 y/o M c/o generalized weakness and sensation that he's going to pass out.  He states that he's had symptoms for a long time.  Patient last had blood drawn a few months ago and was anemic.  He is not taking any medications.  Denies chest pain, shortness of breath. depression, si, NSSIB cannibas use daily, denies other drugs or alcohol use Vital Signs Last 24 Hrs  T(C): 37.2 (06 Jun 2025 10:31), Max: 37.2 (06 Jun 2025 10:31)  T(F): 98.9 (06 Jun 2025 10:31), Max: 98.9 (06 Jun 2025 10:31)  HR: 85 (06 Jun 2025 10:31) (85 - 85)  BP: 121/81 (06 Jun 2025 10:31) (121/81 - 121/81)  BP(mean): --  RR: 17 (06 Jun 2025 10:31) (17 - 17)  SpO2: 99% (06 Jun 2025 10:31) (99% - 99%)    Parameters below as of 06 Jun 2025 10:31  Patient On (Oxygen Delivery Method): room air

## 2025-06-06 NOTE — ED ADULT NURSE REASSESSMENT NOTE - NS ED NURSE REASSESS COMMENT FT1
pt ate about 50% of meal
rcvd pt from RN CB in yellow gown, belonging wanded by security and secured in closet, pt A&Ox4, transgender M>F goes by Kassy, resp even and unlabored no distress noted, pt has superficial wounds to arms and legs, reports having suicidal thoughts with a plan for  "affixation" ... pt provided with food and blanket

## 2025-06-06 NOTE — ED BEHAVIORAL HEALTH ASSESSMENT NOTE - COMMENTS ON VIOLENCE RISK/PROTECTIVE FACTORS:
Patient has PPH depression and si attempts, has supportive family and friends, strong therapeutic relationships, denies access to lethal means, low violence risk to others. Moderate risk for self harm,.

## 2025-06-06 NOTE — ED PROVIDER NOTE - ATTENDING APP SHARED VISIT CONTRIBUTION OF CARE
Nonspecific fatigue for months.  No chest pain or CHELLE. No fever or rash.  no abd pain.  Has been feeling depressed with thought of hurting self.  H/O mood disorder treated with Wellbutrin, Abilify and gabapentin.   Labs unremarkable. Urine tox positive for THC.  ECG SR at 67 .  Seen by  and cleared for outpatient management. recommend follow-up with PMD for further eval of fatigue.

## 2025-06-06 NOTE — ED PROVIDER NOTE - PATIENT PORTAL LINK FT
You can access the FollowMyHealth Patient Portal offered by [facility] by registering at the following website: [email or web address]. By joining [employer]’s FollowMyHealth portal, you will also be able to view your health information using other applications (apps) compatible with our system.

## 2025-06-06 NOTE — ED ADULT TRIAGE NOTE - CHIEF COMPLAINT QUOTE
pt presents c/o feeling very fatigued & difficulty standing up getting progressively worse over the last month, thinks may have anemia. denies dark/bloody stools

## 2025-06-06 NOTE — ED BEHAVIORAL HEALTH ASSESSMENT NOTE - RISK ASSESSMENT
Patient has PPH depression and si attempts, has supportive family and friends, strong therapeutic relationships, denies access to lethal means, low violence risk to others. Moderate risk for self harm. rf:  mental health condition(s)  substance use  stressors    pf:  help seeking  no active si/hi/avh  medication compliant  connected to care

## 2025-07-11 ENCOUNTER — APPOINTMENT (OUTPATIENT)
Dept: CARDIOLOGY | Facility: CLINIC | Age: 30
End: 2025-07-11

## 2025-08-25 ENCOUNTER — APPOINTMENT (OUTPATIENT)
Dept: CARDIOLOGY | Facility: CLINIC | Age: 30
End: 2025-08-25